# Patient Record
Sex: MALE | Race: OTHER | HISPANIC OR LATINO | URBAN - METROPOLITAN AREA
[De-identification: names, ages, dates, MRNs, and addresses within clinical notes are randomized per-mention and may not be internally consistent; named-entity substitution may affect disease eponyms.]

---

## 2019-05-23 ENCOUNTER — EMERGENCY (EMERGENCY)
Facility: HOSPITAL | Age: 39
LOS: 1 days | Discharge: HOME | End: 2019-05-23
Attending: EMERGENCY MEDICINE
Payer: SELF-PAY

## 2019-05-23 VITALS
DIASTOLIC BLOOD PRESSURE: 159 MMHG | SYSTOLIC BLOOD PRESSURE: 245 MMHG | HEART RATE: 94 BPM | WEIGHT: 201.94 LBS | RESPIRATION RATE: 16 BRPM | TEMPERATURE: 99 F | HEIGHT: 70 IN | OXYGEN SATURATION: 100 %

## 2019-05-23 LAB
ALBUMIN SERPL ELPH-MCNC: 5 G/DL — SIGNIFICANT CHANGE UP (ref 3.3–5.2)
ALP SERPL-CCNC: 67 U/L — SIGNIFICANT CHANGE UP (ref 40–120)
ALT FLD-CCNC: 30 U/L — SIGNIFICANT CHANGE UP
AMPHET UR-MCNC: NEGATIVE — SIGNIFICANT CHANGE UP
ANION GAP SERPL CALC-SCNC: 15 MMOL/L — SIGNIFICANT CHANGE UP (ref 5–17)
APPEARANCE UR: CLEAR — SIGNIFICANT CHANGE UP
APTT BLD: 33.5 SEC — SIGNIFICANT CHANGE UP (ref 27.5–36.3)
AST SERPL-CCNC: 22 U/L — SIGNIFICANT CHANGE UP
BARBITURATES UR SCN-MCNC: NEGATIVE — SIGNIFICANT CHANGE UP
BENZODIAZ UR-MCNC: NEGATIVE — SIGNIFICANT CHANGE UP
BILIRUB SERPL-MCNC: <0.2 MG/DL — LOW (ref 0.4–2)
BILIRUB UR-MCNC: NEGATIVE — SIGNIFICANT CHANGE UP
BUN SERPL-MCNC: 24 MG/DL — HIGH (ref 8–20)
CALCIUM SERPL-MCNC: 9.5 MG/DL — SIGNIFICANT CHANGE UP (ref 8.6–10.2)
CHLORIDE SERPL-SCNC: 103 MMOL/L — SIGNIFICANT CHANGE UP (ref 98–107)
CO2 SERPL-SCNC: 23 MMOL/L — SIGNIFICANT CHANGE UP (ref 22–29)
COCAINE METAB.OTHER UR-MCNC: NEGATIVE — SIGNIFICANT CHANGE UP
COLOR SPEC: SIGNIFICANT CHANGE UP
CREAT SERPL-MCNC: 1.85 MG/DL — HIGH (ref 0.5–1.3)
DIFF PNL FLD: NEGATIVE — SIGNIFICANT CHANGE UP
EPI CELLS # UR: SIGNIFICANT CHANGE UP
GLUCOSE SERPL-MCNC: 117 MG/DL — HIGH (ref 70–115)
GLUCOSE UR QL: NEGATIVE MG/DL — SIGNIFICANT CHANGE UP
HCT VFR BLD CALC: 41.6 % — LOW (ref 42–52)
HGB BLD-MCNC: 14 G/DL — SIGNIFICANT CHANGE UP (ref 14–18)
INR BLD: 0.92 RATIO — SIGNIFICANT CHANGE UP (ref 0.88–1.16)
KETONES UR-MCNC: NEGATIVE — SIGNIFICANT CHANGE UP
LEUKOCYTE ESTERASE UR-ACNC: NEGATIVE — SIGNIFICANT CHANGE UP
MAGNESIUM SERPL-MCNC: 2.3 MG/DL — SIGNIFICANT CHANGE UP (ref 1.6–2.6)
MCHC RBC-ENTMCNC: 28.5 PG — SIGNIFICANT CHANGE UP (ref 27–31)
MCHC RBC-ENTMCNC: 33.7 G/DL — SIGNIFICANT CHANGE UP (ref 32–36)
MCV RBC AUTO: 84.6 FL — SIGNIFICANT CHANGE UP (ref 80–94)
METHADONE UR-MCNC: NEGATIVE — SIGNIFICANT CHANGE UP
NITRITE UR-MCNC: NEGATIVE — SIGNIFICANT CHANGE UP
NT-PROBNP SERPL-SCNC: 122 PG/ML — SIGNIFICANT CHANGE UP (ref 0–300)
OPIATES UR-MCNC: NEGATIVE — SIGNIFICANT CHANGE UP
PCP SPEC-MCNC: SIGNIFICANT CHANGE UP
PCP UR-MCNC: NEGATIVE — SIGNIFICANT CHANGE UP
PH UR: 7 — SIGNIFICANT CHANGE UP (ref 5–8)
PLATELET # BLD AUTO: 205 K/UL — SIGNIFICANT CHANGE UP (ref 150–400)
POTASSIUM SERPL-MCNC: 3.8 MMOL/L — SIGNIFICANT CHANGE UP (ref 3.5–5.3)
POTASSIUM SERPL-SCNC: 3.8 MMOL/L — SIGNIFICANT CHANGE UP (ref 3.5–5.3)
PROT SERPL-MCNC: 8.3 G/DL — SIGNIFICANT CHANGE UP (ref 6.6–8.7)
PROT UR-MCNC: 30 MG/DL
PROTHROM AB SERPL-ACNC: 10.6 SEC — SIGNIFICANT CHANGE UP (ref 10–12.9)
RBC # BLD: 4.92 M/UL — SIGNIFICANT CHANGE UP (ref 4.6–6.2)
RBC # FLD: 13.5 % — SIGNIFICANT CHANGE UP (ref 11–15.6)
RBC CASTS # UR COMP ASSIST: SIGNIFICANT CHANGE UP /HPF (ref 0–4)
SODIUM SERPL-SCNC: 141 MMOL/L — SIGNIFICANT CHANGE UP (ref 135–145)
SP GR SPEC: 1.01 — SIGNIFICANT CHANGE UP (ref 1.01–1.02)
THC UR QL: NEGATIVE — SIGNIFICANT CHANGE UP
TROPONIN T SERPL-MCNC: <0.01 NG/ML — SIGNIFICANT CHANGE UP (ref 0–0.06)
TROPONIN T SERPL-MCNC: <0.01 NG/ML — SIGNIFICANT CHANGE UP (ref 0–0.06)
UROBILINOGEN FLD QL: NEGATIVE MG/DL — SIGNIFICANT CHANGE UP
WBC # BLD: 6.8 K/UL — SIGNIFICANT CHANGE UP (ref 4.8–10.8)
WBC # FLD AUTO: 6.8 K/UL — SIGNIFICANT CHANGE UP (ref 4.8–10.8)
WBC UR QL: SIGNIFICANT CHANGE UP

## 2019-05-23 PROCEDURE — 70450 CT HEAD/BRAIN W/O DYE: CPT | Mod: 26

## 2019-05-23 PROCEDURE — 99218: CPT

## 2019-05-23 PROCEDURE — 93306 TTE W/DOPPLER COMPLETE: CPT | Mod: 26

## 2019-05-23 PROCEDURE — 93010 ELECTROCARDIOGRAM REPORT: CPT | Mod: 76

## 2019-05-23 PROCEDURE — 71046 X-RAY EXAM CHEST 2 VIEWS: CPT | Mod: 26

## 2019-05-23 RX ORDER — AMLODIPINE BESYLATE 2.5 MG/1
10 TABLET ORAL ONCE
Refills: 0 | Status: COMPLETED | OUTPATIENT
Start: 2019-05-23 | End: 2019-05-23

## 2019-05-23 RX ORDER — LABETALOL HCL 100 MG
10 TABLET ORAL ONCE
Refills: 0 | Status: COMPLETED | OUTPATIENT
Start: 2019-05-23 | End: 2019-05-23

## 2019-05-23 RX ORDER — METFORMIN HYDROCHLORIDE 850 MG/1
500 TABLET ORAL ONCE
Refills: 0 | Status: COMPLETED | OUTPATIENT
Start: 2019-05-23 | End: 2019-05-23

## 2019-05-23 RX ORDER — HYDRALAZINE HCL 50 MG
10 TABLET ORAL ONCE
Refills: 0 | Status: COMPLETED | OUTPATIENT
Start: 2019-05-23 | End: 2019-05-23

## 2019-05-23 RX ORDER — FUROSEMIDE 40 MG
20 TABLET ORAL DAILY
Refills: 0 | Status: DISCONTINUED | OUTPATIENT
Start: 2019-05-23 | End: 2019-05-24

## 2019-05-23 RX ORDER — ACETAMINOPHEN 500 MG
975 TABLET ORAL ONCE
Refills: 0 | Status: COMPLETED | OUTPATIENT
Start: 2019-05-23 | End: 2019-05-23

## 2019-05-23 RX ADMIN — AMLODIPINE BESYLATE 10 MILLIGRAM(S): 2.5 TABLET ORAL at 21:08

## 2019-05-23 RX ADMIN — Medication 10 MILLIGRAM(S): at 18:20

## 2019-05-23 RX ADMIN — METFORMIN HYDROCHLORIDE 500 MILLIGRAM(S): 850 TABLET ORAL at 21:08

## 2019-05-23 RX ADMIN — Medication 10 MILLIGRAM(S): at 17:49

## 2019-05-23 RX ADMIN — Medication 20 MILLIGRAM(S): at 21:08

## 2019-05-23 RX ADMIN — Medication 975 MILLIGRAM(S): at 23:58

## 2019-05-23 RX ADMIN — Medication 1.25 MILLIGRAM(S): at 17:49

## 2019-05-23 RX ADMIN — Medication 10 MILLIGRAM(S): at 18:00

## 2019-05-23 NOTE — ED ADULT NURSE NOTE - CAS EDN DISCHARGE ASSESSMENT
Symptoms improved/Dressing clean and dry/Alert and oriented to person, place and time/Patient baseline mental status/Awake

## 2019-05-23 NOTE — ED ADULT TRIAGE NOTE - CHIEF COMPLAINT QUOTE
Pt comes to ED from PMD for HTN, BP in /159, takes amlodipine, furosemide and edarbi daily, offers no complaints at this time

## 2019-05-23 NOTE — ED PROVIDER NOTE - EKG ADDITIONAL INFORMATION FREE TEXT
nsr 88 t wave flattening,inversion lateral and inferior leads nsr 88 t wave flattening,inversion lateral and inferior leads - will admit to OBS cardiac protocol [facility] cards to follow in am

## 2019-05-23 NOTE — ED PROVIDER NOTE - CPE EDP SKIN NORM
[facility] Cardiology Discharge Summary     Patient ID:  Yoan Everett  [id number]  81 y.o.  1976    Admit date: 1/10/2017    Discharge date:  1/12/17    Admitting Physician: Donovan Guidry MD     Discharge Physician: Paul Roche NP/Dr. Liseth Wright    Admission Diagnoses: a fib and chest pain    Discharge Diagnoses:   Patient Active Problem List    Diagnosis Date Noted    Coronary artery disease involving native coronary artery of native heart with unstable angina pectoris ([facility] [place] [address] 01/12/2017    Paroxysmal atrial fibrillation ([facility] [place] [address] 01/11/2017    Precordial pain 01/11/2017       Cardiology Procedures this admission:  Left heart catheterization with PCI  EchoCardiogram  Consults: None    Hospital Course: Patient was seen in ED at [facility] with complaints of chest pain during atrial fibrillation consistent with unstable angina. The patient was scheduled for a LHC at [facility] on 1/11/17. Patient underwent cardiac catheterization by Dr. Donna Sifuentes. Patient was found to have a 70% stenosis of the pLAD (FFR was markedly abnormal at 0.69, indicating hemodynamically significant stenosis.) that was stented with a 3.5 x 28 mm Synergy RUBEN with 0% residual stenosis. Patient tolerated the procedure well and was taken to the CV stepdown floor for recovery. The earlier morning of 1/11/17, the patient converted back to NSR. The morning of 1/11/17, the patient remained in NSR but sotalol was stopped and lopressor and asa ([id number] of 0) were started. The earlier morning of1/12/17, the patient had short 5 beat run NSVT (asymptomatic with no chest pain) so patient was monitored until the afternoon with no reoccurrence. The patient will be d/c home with close follow up. Patient's right radial cath site was clean, dry and intact without hematoma or bruit. Patient's labs were WNL. Patient was seen and examined by Dr. Liseth Wright and determined stable and ready for discharge.  Patient was instructed on the importance of medication compliance including taking aspirin and effient everyday without missing a dose. After receiving drug eluting stents, the patient will remain on dual anti-platelet therapy for 1 year. For maximized medical therapy for CAD, patient will continue BB and statin as well. The patient will follow up with [facility] Cardiology Dr. Pham Pearson on Jan 17th at 1 am in [place] office and has been referred to cardiac rehab. DISPOSITION: The patient is being discharged home in stable condition on a low saturated fat, low cholesterol and low salt diet. The patient is instructed to advance activities as tolerated to the limit of fatigue or shortness of breath. The patient is instructed to avoid all heavy lifting for 5 days. The patient is instructed to watch the cath site for bleeding/oozing; if seen, the patient is instructed to apply firm pressure with a clean cloth and call [facility]iology at [phone]. The patient is instructed to watch for signs of infection which include: increasing area of redness, fever/hot to touch or purulent drainage at the catheterization site. The patient is instructed not to soak in a bathtub for 7-10 days, but is cleared to shower. The patient is instructed to call the office or return to the ER for immediate evaluation for any shortness of breath or chest pain not relieved by NTG. Discharge Exam:   Visit Vitals    /69    Pulse 68    Temp 97.9 °F (36.6 °C)    Resp 17    Ht 5' 9\" (1.753 m)    Wt 83.1 kg (183 lb 3.2 oz)    SpO2 99%    BMI 27.05 kg/m2     Patient has been seen by Dr. Darrius Hernandez: see his progress note for exam details.     Recent Results (from the past 24 hour(s))   PTT    Collection Time: 01/11/17 11:56 AM   Result Value Ref Range    aPTT 60.0 (H) 23.5 - 31.7 SEC   EKG, 12 LEAD, INITIAL    Collection Time: 01/11/17  7:03 PM   Result Value Ref Range    Systolic BP  mmHg    Diastolic BP  mmHg    Ventricular Rate 72 BPM    Atrial Rate 72 BPM    P-R Interval 144 ms    QRS Duration 84 ms    Q-T Interval 406 ms    QTC Calculation (Bezet) 444 ms    Calculated P Axis 63 degrees    Calculated R Axis 20 degrees    Calculated T Axis 63 degrees    Diagnosis       Normal sinus rhythm  Normal ECG  Confirmed by Akash Carrillo MD (), AMAN WARD ([id number]) on 1/11/2017 68:82:15 PM     METABOLIC PANEL, BASIC    Collection Time: 01/12/17  3:35 AM   Result Value Ref Range    Sodium 143 136 - 145 mmol/L    Potassium 4.1 3.5 - 5.1 mmol/L    Chloride 107 98 - 107 mmol/L    CO2 26 21 - 32 mmol/L    Anion gap 10 7 - 16 mmol/L    Glucose 90 65 - 100 mg/dL    BUN 7 6 - 23 MG/DL    Creatinine 0.71 (L) 0.8 - 1.5 MG/DL    GFR est AA >60 >60 ml/min/1.73m2    GFR est non-AA >60 >60 ml/min/1.73m2    Calcium 8.0 (L) 8.3 - 10.4 MG/DL   LIPID PANEL    Collection Time: 01/12/17  3:35 AM   Result Value Ref Range    LIPID PROFILE          Cholesterol, total 119 <200 MG/DL    Triglyceride 120 35 - 150 MG/DL    HDL Cholesterol 25 (L) 40 - 60 MG/DL    LDL, calculated 70 <100 MG/DL    VLDL, calculated 24 (H) 6.0 - 23.0 MG/DL    CHOL/HDL Ratio 4.8     MAGNESIUM    Collection Time: 01/12/17  3:35 AM   Result Value Ref Range    Magnesium 2.2 1.8 - 2.4 mg/dL   CBC WITH AUTOMATED DIFF    Collection Time: 01/12/17  3:35 AM   Result Value Ref Range    WBC 4.3 4.3 - 11.1 K/uL    RBC 2.77 (L) 4.23 - 5.67 M/uL    HGB 11.8 (L) 13.6 - 17.2 g/dL    HCT 33.8 (L) 41.1 - 50.3 %    .0 (H) 79.6 - 97.8 FL    MCH 42.6 (H) 26.1 - 32.9 PG    MCHC 34.9 31.4 - 35.0 g/dL    RDW 12.8 11.9 - 14.6 %    PLATELET 403 632 - 487 K/uL    MPV 9.8 (L) 10.8 - 14.1 FL    DF AUTOMATED      NEUTROPHILS 46 43 - 78 %    LYMPHOCYTES 28 13 - 44 %    MONOCYTES 19 (H) 4.0 - 12.0 %    EOSINOPHILS 7 0.5 - 7.8 %    BASOPHILS 0 0.0 - 2.0 %    IMMATURE GRANULOCYTES 0.2 0.0 - 5.0 %    ABS. NEUTROPHILS 2.0 1.7 - 8.2 K/UL    ABS. LYMPHOCYTES 1.2 0.5 - 4.6 K/UL    ABS. MONOCYTES 0.8 0.1 - 1.3 K/UL    ABS. EOSINOPHILS 0.3 0.0 - 0.8 K/UL    ABS.  BASOPHILS 0.0 0.0 - 0.2 K/UL    ABS. IMM. GRANS. 0.0 0.0 - 0.5 K/UL         Patient Instructions:   Current Discharge Medication List      START taking these medications    Details   aspirin 81 mg chewable tablet Take 1 Tab by mouth daily. Qty: 30 Tab, Refills: 0      atorvastatin (LIPITOR) 80 mg tablet Take 1 Tab by mouth nightly. Qty: 30 Tab, Refills: 11      metoprolol tartrate (LOPRESSOR) 25 mg tablet Take 1 Tab by mouth every twelve (12) hours. Qty: 60 Tab, Refills: 11      prasugrel (EFFIENT) 10 mg tablet Take 1 Tab by mouth daily. Qty: 30 Tab, Refills: 11      nitroglycerin (NITROSTAT) 0.4 mg SL tablet 1 Tab by SubLINGual route every five (5) minutes as needed for Chest Pain.   Qty: 1 Bottle, Refills: 5                 Signed:  SAVAGE Odell  1/12/2017  9:16 AM    Yasir Devi MD normal...

## 2019-05-23 NOTE — ED CDU PROVIDER INITIAL DAY NOTE - OBJECTIVE STATEMENT
37 y/o M hx of HTN. [place] ER from doctors [place] for 's/180's but asymptomatic. pressure reduced in ER.  placed in observation for trending of trops and [place] cardiology consultation for recommendations for persistent elevated BP. denies cp sob headache nausea abdominal pains

## 2019-05-24 VITALS
DIASTOLIC BLOOD PRESSURE: 109 MMHG | SYSTOLIC BLOOD PRESSURE: 166 MMHG | RESPIRATION RATE: 18 BRPM | TEMPERATURE: 99 F | HEART RATE: 74 BPM | OXYGEN SATURATION: 98 %

## 2019-05-24 DIAGNOSIS — I16.0 HYPERTENSIVE URGENCY: ICD-10-CM

## 2019-05-24 LAB
ALBUMIN SERPL ELPH-MCNC: 4.5 G/DL — SIGNIFICANT CHANGE UP (ref 3.3–5.2)
ALP SERPL-CCNC: 63 U/L — SIGNIFICANT CHANGE UP (ref 40–120)
ALT FLD-CCNC: 25 U/L — SIGNIFICANT CHANGE UP
ANION GAP SERPL CALC-SCNC: 14 MMOL/L — SIGNIFICANT CHANGE UP (ref 5–17)
AST SERPL-CCNC: 18 U/L — SIGNIFICANT CHANGE UP
BILIRUB SERPL-MCNC: 0.4 MG/DL — SIGNIFICANT CHANGE UP (ref 0.4–2)
BUN SERPL-MCNC: 20 MG/DL — SIGNIFICANT CHANGE UP (ref 8–20)
CALCIUM SERPL-MCNC: 9.3 MG/DL — SIGNIFICANT CHANGE UP (ref 8.6–10.2)
CHLORIDE SERPL-SCNC: 104 MMOL/L — SIGNIFICANT CHANGE UP (ref 98–107)
CO2 SERPL-SCNC: 24 MMOL/L — SIGNIFICANT CHANGE UP (ref 22–29)
CREAT SERPL-MCNC: 1.28 MG/DL — SIGNIFICANT CHANGE UP (ref 0.5–1.3)
GLUCOSE SERPL-MCNC: 110 MG/DL — SIGNIFICANT CHANGE UP (ref 70–115)
POTASSIUM SERPL-MCNC: 4 MMOL/L — SIGNIFICANT CHANGE UP (ref 3.5–5.3)
POTASSIUM SERPL-SCNC: 4 MMOL/L — SIGNIFICANT CHANGE UP (ref 3.5–5.3)
PROT SERPL-MCNC: 7.3 G/DL — SIGNIFICANT CHANGE UP (ref 6.6–8.7)
SODIUM SERPL-SCNC: 142 MMOL/L — SIGNIFICANT CHANGE UP (ref 135–145)
TROPONIN T SERPL-MCNC: <0.01 NG/ML — SIGNIFICANT CHANGE UP (ref 0–0.06)

## 2019-05-24 PROCEDURE — 36415 COLL VENOUS BLD VENIPUNCTURE: CPT

## 2019-05-24 PROCEDURE — 93005 ELECTROCARDIOGRAM TRACING: CPT

## 2019-05-24 PROCEDURE — 96374 THER/PROPH/DIAG INJ IV PUSH: CPT | Mod: XU

## 2019-05-24 PROCEDURE — 85610 PROTHROMBIN TIME: CPT

## 2019-05-24 PROCEDURE — 81001 URINALYSIS AUTO W/SCOPE: CPT

## 2019-05-24 PROCEDURE — G0378: CPT

## 2019-05-24 PROCEDURE — 80053 COMPREHEN METABOLIC PANEL: CPT

## 2019-05-24 PROCEDURE — 99223 1ST HOSP IP/OBS HIGH 75: CPT

## 2019-05-24 PROCEDURE — 80307 DRUG TEST PRSMV CHEM ANLYZR: CPT

## 2019-05-24 PROCEDURE — 96375 TX/PRO/DX INJ NEW DRUG ADDON: CPT | Mod: XU

## 2019-05-24 PROCEDURE — 85730 THROMBOPLASTIN TIME PARTIAL: CPT

## 2019-05-24 PROCEDURE — 83735 ASSAY OF MAGNESIUM: CPT

## 2019-05-24 PROCEDURE — 70450 CT HEAD/BRAIN W/O DYE: CPT

## 2019-05-24 PROCEDURE — 93010 ELECTROCARDIOGRAM REPORT: CPT

## 2019-05-24 PROCEDURE — 99285 EMERGENCY DEPT VISIT HI MDM: CPT | Mod: 25

## 2019-05-24 PROCEDURE — 84484 ASSAY OF TROPONIN QUANT: CPT

## 2019-05-24 PROCEDURE — 85027 COMPLETE CBC AUTOMATED: CPT

## 2019-05-24 PROCEDURE — 71046 X-RAY EXAM CHEST 2 VIEWS: CPT

## 2019-05-24 PROCEDURE — C8929: CPT

## 2019-05-24 PROCEDURE — 99217: CPT

## 2019-05-24 PROCEDURE — 83880 ASSAY OF NATRIURETIC PEPTIDE: CPT

## 2019-05-24 RX ORDER — AZILSARTAN KAMEDOXOMIL 40 MG/1
1 TABLET ORAL
Qty: 30 | Refills: 0
Start: 2019-05-24 | End: 2019-06-22

## 2019-05-24 RX ORDER — LOSARTAN POTASSIUM 100 MG/1
100 TABLET, FILM COATED ORAL DAILY
Refills: 0 | Status: DISCONTINUED | OUTPATIENT
Start: 2019-05-24 | End: 2019-05-28

## 2019-05-24 RX ORDER — METFORMIN HYDROCHLORIDE 850 MG/1
1 TABLET ORAL
Qty: 60 | Refills: 0
Start: 2019-05-24 | End: 2019-06-22

## 2019-05-24 RX ORDER — AMLODIPINE BESYLATE 2.5 MG/1
10 TABLET ORAL ONCE
Refills: 0 | Status: COMPLETED | OUTPATIENT
Start: 2019-05-24 | End: 2019-05-24

## 2019-05-24 RX ORDER — AMLODIPINE BESYLATE 2.5 MG/1
1 TABLET ORAL
Qty: 30 | Refills: 0
Start: 2019-05-24 | End: 2019-06-22

## 2019-05-24 RX ORDER — AMLODIPINE BESYLATE 2.5 MG/1
10 TABLET ORAL DAILY
Refills: 0 | Status: DISCONTINUED | OUTPATIENT
Start: 2019-05-24 | End: 2019-05-28

## 2019-05-24 RX ADMIN — AMLODIPINE BESYLATE 10 MILLIGRAM(S): 2.5 TABLET ORAL at 13:31

## 2019-05-24 RX ADMIN — Medication 0.1 MILLIGRAM(S): at 04:55

## 2019-05-24 RX ADMIN — Medication 975 MILLIGRAM(S): at 00:58

## 2019-05-24 RX ADMIN — LOSARTAN POTASSIUM 100 MILLIGRAM(S): 100 TABLET, FILM COATED ORAL at 16:14

## 2019-05-24 NOTE — CONSULT NOTE ADULT - ASSESSMENT
Improved MALLORY   Was on Lasix , but volume status looks OK   EWould hold Lasix   [place] UA   Normal renal sonogram 2016    HTN Urgency   BP trending better   UDS negative   CT head OK   CXR OK   LVH , preserved EF on ECHO   As per family , there is a h/o non-compliance with meds   Give Norvasc 10 mg now   Can Continue on Edarbi 80 mg daily and Clonidine 0.1 mg po bid   If BP gets better , and pt improved, I have given card for him top follow up w/ me in 1 week as outpatient   Discussed with his family too.

## 2019-05-24 NOTE — CONSULT NOTE ADULT - ATTENDING COMMENTS
Suspect resistant secondary hypertension possibly on a renal basis. Will c/s with nephrology for further w/u of possible secondary causes and medication recommendation in setting of renal dysfunction.

## 2019-05-24 NOTE — CONSULT NOTE ADULT - SUBJECTIVE AND OBJECTIVE BOX
Patient is a 38y old  Male who presents with a chief complaint of     HPI: Was sent in from Dr Jaffe's office with very elevated BP   Feels better   CT head negative   ECHO done   Was on 40 mg Lasix and Edarbi as outpatient   No h/o edema or CHF   Feels better   Creat was 1.8 , now 1.2   h/o non compliance with BP meds as per family   UDS Negative     PAST MEDICAL & SURGICAL HISTORY:  No pertinent past medical history  No significant past surgical history      FAMILY HISTORY:  No pertinent family history in first degree relatives      Social History:    MEDICATIONS  (STANDING):  cloNIDine 0.1 milliGRAM(s) Oral two times a day  furosemide    Tablet 20 milliGRAM(s) Oral daily    MEDICATIONS  (PRN):      Allergies    No Known Allergies    Intolerances      Vital Signs Last 24 Hrs  T(C): 37 (24 May 2019 11:27), Max: 37.3 (23 May 2019 17:23)  T(F): 98.6 (24 May 2019 11:27), Max: 99.2 (23 May 2019 17:23)  HR: 73 (24 May 2019 11:27) (71 - 94)  BP: 170/107 (24 May 2019 11:27) (149/88 - 245/159)  BP(mean): --  RR: 17 (24 May 2019 11:27) (16 - 20)  SpO2: 98% (24 May 2019 11:27) (98% - 100%)  Daily Height in cm: 177.8 (23 May 2019 17:23)    Daily   I&O's Detail    I&O's Summary      PHYSICAL EXAM:    GENERAL: NAD,   HEAD:  Atraumatic, No edema   NECK: Supple, No JVD,   CHEST/LUNG: Clear / EAE , No wheeze   HEART: Regular rate and rhythm; No rub   ABDOMEN: Soft, Nontender, Nondistended  EXTREMITIES: No edema     LABS:                        14.0   6.8   )-----------( 205      ( 23 May 2019 17:42 )             41.6     05-    142  |  104  |  20.0  ----------------------------<  110  4.0   |  24.0  |  1.28    Ca    9.3      24 May 2019 09:42  Mg     2.3         TPro  7.3  /  Alb  4.5  /  TBili  0.4  /  DBili  x   /  AST  18  /  ALT  25  /  AlkPhos  63      PT/INR - ( 23 May 2019 17:42 )   PT: 10.6 sec;   INR: 0.92 ratio         PTT - ( 23 May 2019 17:42 )  PTT:33.5 sec  Urinalysis Basic - ( 23 May 2019 20:34 )    Color: Pale Yellow / Appearance: Clear / S.010 / pH: x  Gluc: x / Ketone: Negative  / Bili: Negative / Urobili: Negative mg/dL   Blood: x / Protein: 30 mg/dL / Nitrite: Negative   Leuk Esterase: Negative / RBC: 0-2 /HPF / WBC 0-2   Sq Epi: x / Non Sq Epi: Occasional / Bacteria: x      Magnesium, Serum: 2.3 mg/dL ( @ 17:42)      AM:  CT BRAIN                          PROCEDURE DATE:  2019          INTERPRETATION:  HISTORY: Hypertension, headache    COMPARISON: 2016    Multiple contiguous transaxial images of the brain were obtained from the   skull base to thevertex. Reformatted sagittal and coronal imaging also   performed.    FINDINGS: There is no evidence for recent intraparenchymal hemorrhage or   acute territorial type infarct. No effacement of the overlying cortical   sulci is identified. No midlineshift is demonstrated. There is no   evidence for hydrocephalus. No extra-axial fluid collections are noted.     There is no evidence for acute osseous fracture. Mild ethmoid mucosal   thickening is noted. Regions of polypoidal thickening identified within   the visualized portions of the right maxillary and left sphenoid sinus,   stable. Prominent polypoidal mucosal thickening is identified within the   visualized portion of the left maxillary paranasal sinus with focal   calcification, stable.    IMPRESSION: There is no evidence for recent intraparenchymal hemorrhage   or acute territorial type infarct. No extra-axial fluid collections   identified. See full discussion above.             EXAM:  XR CHEST PA LAT 2V                          PROCEDURE DATE:  2019          INTERPRETATION:  History: Chest pain    PA and lateral radiographs of the chest are performed. There are no prior   studies for comparison.    The cardiomediastinal silhouette is normal. The telma are not enlarged.   The trachea is midline. There is no focal infiltrate or pleural effusion.   The osseous structures are intact.    Impression: No active disease.                DRE BIRMINGHAM M.D.,ATTENDING RADIOLOGIST  This document has been electronically signed. May 23 2019  6:59PM          :  ECHO TTE W CON COMPLETE W DOPP      PROCEDURE DATE:  May 23 2019   .      INTERPRETATION:  REPORT:    TRANSTHORACIC ECHOCARDIOGRAM REPORT         Patient Name:   BLANCA GRIFFIN Patient Location: [facility] Rec #:  [id number]        Accession #:      [id number]  Account #:                      Height:           69.7 in 177.0 cm  YOB: 1980       Weight:           200.6 lb 91.00 kg  Patient Age:    38 years        BSA:              2.08 m²  Patient Gender: M  BP:               187/92 mmHg       Date of Exam:        2019 9:54:37 PM  Sonographer:         Montana Li  Referring Physician: Naveed Machuca DO    Procedure:     2D Echo/Doppler/Color Doppler Complete.  Indications:   Essential (primary) hypertension - I10  Diagnosis:     Essential (primary) hypertension - I10  Study Details: Technically good study.         2D AND M-MODE MEASUREMENTS (normal ranges within parentheses):  Left                 Normal   Aorta/Left            Normal  Ventricle:                    Atrium:  IVSd (2D):    1.49  (0.7-1.1) Aortic Root  3.62 cm (2.4-3.7)                 cm             (2D):  LVPWd (2D):   1.52  (0.7-1.1) Left Atrium  3.43 cm (1.9-4.0)                 cm             (2D):  LVIDd (2D):   4.91  (3.4-5.7) LA Volume     27.7                 cm             Index         ml/m²  LVIDs (2D):   3.42            Right Ventricle:                 cm             RVd (2D):        3.32 cm  LV FS (2D):   30.3   (>25%)   TAPSE:           2.11 cm      %  Relative Wall 0.62   (<0.42)  Thickness    LV DIASTOLIC FUNCTION:  MV Peak E: 0.81 m/s e', MV Oralia: 0.11 m/s  MV Peak A: 0.98 m/s E/e' Ratio: 7.21  E/A Ratio: 0.83     Decel Time: 176 msec    SPECTRAL DOPPLER ANALYSIS (where applicable):  Mitral Valve:  MV P1/2 Time: 51.04 msec  MV Area, PHT: 4.31 cm²    Aortic Valve: AoV Max Danny: 1.35 m/s AoV Peak P.3 mmHg AoV Mean PG:    LVOT Vmax: 0.87 m/s LVOT VTI:  LVOT Diameter: 2.03 cm    AoV Area, Vmax: 2.10 cm² AoV Area, VTI:  AoV Area, Vmn:  Ao VTI:  Pulmonic Valve:  PV Max Velocity: 1.10 m/s PV Max P.8 mmHg PV Mean PG:       PHYSICIAN INTERPRETATION:  Left Ventricle: The left ventricular internal cavity size is normal.  Global LV systolic function was normal. Left ventricular ejection  fraction, by visual estimation, is 55 to 60%. Spectral Doppler shows   normal pattern of LV diastolic filling.  Right Ventricle: The right ventricular size is normal. RV systolic   function is normal. TV S' 0.1 m/s.  Left Atrium: Normal left atrialsize.  Right Atrium: Normal right atrial size.  Pericardium: There is no evidence of pericardial effusion.  Mitral Valve: Structurally normal mitral valve, with normal leaflet   excursion. Trace mitral valve regurgitation is seen.  Tricuspid Valve: The tricuspid valve is normal in structure. Trivial   tricuspid regurgitation is visualized. Adequate TR velocity was not   obtained to accurately assess RVSP.  Aortic Valve: Normal trileaflet aortic valve with normal opening. No   evidence of aortic valve regurgitation is seen.  Pulmonic Valve: The pulmonic valve is normal. Trace pulmonic valve   regurgitation.  Aorta: The aortic root is normal in size and structure. There is   dilatation of the ascending aorta.  Pulmonary Artery: The pulmonary artery is of normal size and origin.  Venous: The pulmonary veins appear normal. The inferior vena cava is   normal. The inferior vena cava was normal sized, with respiratory size   variation greater than 50%.       Summary:   1. Left ventricular ejection fraction, by visual estimation, is 55 to   60%.   2. Normal global left ventricular systolic function.   3. There is moderate concentric left ventricular hypertrophy.   4. Dilatation of the ascending aorta.    MD Ilya Electronically signed on 2019 at 8:01:44 AM              *** Final ***                  ROSY STEWART MD  This document has been electronically signed. May 23 2019  9:54PM          KAELA ZAMBRANO   This document has been electronically signed. May 23 2019  6:26PM         EXAM:  US KIDNEY(S)                            PROCEDURE DATE:  2016      INTERPRETATION:  INDICATIONS:  Hypertensive emergency. Evaluate for   underlying renal abnormality.    TECHNIQUE:  The examination was performed with the real time apparatus   with color flow and spectral doppler imaging.    COMPARISON EXAMINATION:  No prior exam    FINDINGS:  RIGHT KIDNEY:  Normal in size, shape, and configuration measuring 10.9 x   4.6 x 5.7 cm.  No cystic or solid masses.  No calculi.  LEFT KIDNEY:  Normal in size, shape, and configuration measuring 11.4 x   6.7 x 4.5 cm. No  solid masses.  No calculi. Small mid pole simple cyst   measuring 1.8 x 1.7 cm.    There was no evidence of obstruction in either kidney. Bilateral ureteral   jets visualized.    IMPRESSION: Normal renal sonogram.                 DAMARIS GUERRA M.D., ATTENDING RADIOLOGIST  This document has been electronically signed. 2016  6:26P                    RADIOLOGY & ADDITIONAL TESTS:

## 2019-05-24 NOTE — CONSULT NOTE ADULT - ASSESSMENT
39 y/o male previously diagnosed with HTN presents from PMD office with hypertensive urgency, asymptomatic. Found to have SBP>200 mmHg despite compliance with meds.

## 2019-05-24 NOTE — CONSULT NOTE ADULT - PROBLEM SELECTOR RECOMMENDATION 9
-observation on telemetry overnight  -continue Amlodipine 10 mg, Edarbi 80 mg and Lasix 20 mg  -add Clonidine 0.1 mg Q12  -Labetalol 10 mg IV Q4prn for SBP>180  -echocardiogram  -low-sodium diet  -nephrology consult

## 2019-05-24 NOTE — CONSULT NOTE ADULT - SUBJECTIVE AND OBJECTIVE BOX
History obtained by: Patient and medical record     obtained: No    Chief complaint:  "My blood pressure is high"    HPI:  This is 37 y/o male with hx of HTN and DM2 who presents from PMD office with hypertensive urgency. Pt states he was originally diagnosed with HTN in  and was initially compliant with medications but stopped taking them about 2 years ago. He became Dr. Jaffe's pt a month ago and at that time he was started on Amlodipine, Edarbi and Lasix  but BP still elevated despite compliance. Pt now referred to the hospital for evaluation.  On arrival to the ER /159, treated with Labetalol, Hydralazine and Vasotec IV. Pt denies chest pain, SOB, syncope or leg swelling. He gets occasional palpitations when going to sleep, otherwise asymptomatic, no headaches or dizziness. EKG shows SR 88 bpm with inverted T-waves in inferior and lateral leads (similar to EKG in 2016), no ectopy on telemetry. Troponin negative x2, proBNP normal. Pt also found to have elevated creatinine and proteinuria.       REVIEW OF SYMPTOMS:   Cardiovascular: as per HPI  Respiratory:  denies dyspnea,   cough,     Genitourinary:  No dysuria, no hematuria;   Gastrointestinal:   No dark color stool, no melena, no diarrhea, no constipation, no abdominal pain;   Neurological: No headache, no dizziness, no slurred speech;    Psychiatric: No agitation, no anxiety.  ALL OTHER REVIEW OF SYSTEMS ARE NEGATIVE.    MEDICATIONS  (home):  Amlodipine 10 mg  Edarbi 80 mg  Lasix 20 mg  Metformin 500 mg        PAST MEDICAL & SURGICAL HISTORY:  No pertinent past medical history  No significant past surgical history      FAMILY HISTORY:  father HTN, DM2  brother HTN      SOCIAL HISTORY: lives alone, works in factory as     CIGARETTES: never smoked    ALCOHOL: denies    DRUGS: denies    Vital Signs Last 24 Hrs  T(C): 36.7 (23 May 2019 23:30), Max: 37.3 (23 May 2019 17:23)  T(F): 98 (23 May 2019 23:30), Max: 99.2 (23 May 2019 17:23)  HR: 87 (23 May 2019 23:30) (71 - 94)  BP: 169/106 (23 May 2019 23:30) (167/70 - 245/159)  BP(mean): --  RR: 17 (23 May 2019 23:30) (16 - 20)  SpO2: 98% (23 May 2019 23:30) (98% - 100%)    PHYSICAL EXAM:  General: WN/WD NAD  Neurology: A&Ox3, nonfocal, MARSHALL x 4  Eyes: PERRL/ EOMI, Gross vision intact  ENT/Neck: Neck supple, trachea midline, No JVD, Gross hearing intact  Respiratory: CTA B/L, No wheezing, rales, rhonchi  CV: RRR, S1S2, no murmurs, rubs or gallops  Abdominal: Soft, NT, ND +BS,   Extremities: No edema, + peripheral pulses  Skin: No Rashes, Hematoma, Ecchymosis          INTERPRETATION OF TELEMETRY: SR 80's-90's, no ectopy    ECG: SR 88 bpm, inverted T-waves in inferior and lateral leads      I&O's Detail      LABS:                        14.0   6.8   )-----------( 205      ( 23 May 2019 17:42 )             41.6     05-    141  |  103  |  24.0<H>  ----------------------------<  117<H>  3.8   |  23.0  |  1.85<H>    Ca    9.5      23 May 2019 17:42  Mg     2.3     05-    TPro  8.3  /  Alb  5.0  /  TBili  <0.2<L>  /  DBili  x   /  AST  22  /  ALT  30  /  AlkPhos  67  05-23    CARDIAC MARKERS ( 23 May 2019 21:28 )  x     / <0.01 ng/mL / x     / x     / x      CARDIAC MARKERS ( 23 May 2019 17:42 )  x     / <0.01 ng/mL / x     / x     / x          PT/INR - ( 23 May 2019 17:42 )   PT: 10.6 sec;   INR: 0.92 ratio         PTT - ( 23 May 2019 17:42 )  PTT:33.5 sec  Urinalysis Basic - ( 23 May 2019 20:34 )    Color: Pale Yellow / Appearance: Clear / S.010 / pH: x  Gluc: x / Ketone: Negative  / Bili: Negative / Urobili: Negative mg/dL   Blood: x / Protein: 30 mg/dL / Nitrite: Negative   Leuk Esterase: Negative / RBC: 0-2 /HPF / WBC 0-2   Sq Epi: x / Non Sq Epi: Occasional / Bacteria: x      I&O's Summary      RADIOLOGY & ADDITIONAL STUDIES:  X-ray:    PREVIOUS DIAGNOSTIC TESTING:      ECHO  < from: TTE Echo Complete w/Doppler (16 @ 10:02) >  IMPRESSION: Summary:   1. The left atrium is normal in size.   2. Normal left ventricular internal cavity size. Moderate concentric   left ventricular hypertrophy.   3. Normal wall motion. Left ventricular ejection fraction, by visual   estimation, is 60 to 65%.   4. The right atrium is normal in size.   5. Normal right ventricular size and function.   6. No significant valvular abnormality.   7. There is no evidence of pericardial effusion.     MD Jordi Electronically signed on 2016 at 3:10:48 PM    < end of copied text >      STRESS    < from: Nuclear Stress Test-Pharmacologic (16 @ 10:58) >  GATED ANALYSIS:  Gated wall motion analysis is performed, and shows normal  wall motion with post stress LVEF of 68%.  ------------------------------------------------------------------------      LV/RV OBSERVATION:  Normal LV ejection fraction.  ------------------------------------------------------------------------    IMPRESSIONS:Normal Study  There was no chest pain following regadenoson infusion.  There were no diagnostic ECG changes.  The left ventricle was normal in size. Concentric  hypertrophy noted.  Normal myocardial perfusion scan, with  no evidence of infarction or inducible ischemia.There is a  small, mild defect, suggestive of no clear evidence of  ischemia consistent with attenuation artifact.Review of  raw data shows: Diaphragmatic artifact.  Gated wall motion analysis is performed, and shows normal  wall motion with post stress LVEF of 68%.    ------------------------------------------------------------------------      ------------------------------------------------------------------------    Confirmed on  2016 - 17:43:12 by Jose Sin MD  Cardiology Fellow: GERARD Buitrago    < end of copied text >      CATHETERIZATION: n/a

## 2019-05-24 NOTE — ED CDU PROVIDER DISPOSITION NOTE - CARE PROVIDER_API CALL
Cortes Georges (MD)  Cardiovascular Disease; Internal Medicine  [address] [place] [address]  Phone: [phone]  Fax: [phone]  Follow Up Time:     Donny Rankin (DO)  Medicine  [address] [place] [address] [place] [address]  Phone: [phone]  Fax: [phone]  Follow Up Time:

## 2019-05-24 NOTE — ED CDU PROVIDER DISPOSITION NOTE - CLINICAL COURSE
38 M with hypertensive urgency 200/100, seen by cardiology reccomended medications and seen by nephrology as patients BUN/cr took a bump, rpt WNL, will c/w Edarbi, clonidine, amlodopine and f/u as outpatient

## 2019-05-24 NOTE — ED CDU PROVIDER DISPOSITION NOTE - ATTENDING CONTRIBUTION TO CARE
37 yo male pmh htn with hypertensive urgency; improved with tx and meds changed as per recommendations by nephrology; continue edarbi, clonidine and amlodipine ;  close follow up with pcp and nephrology as outpatient

## 2019-05-24 NOTE — ED ADULT NURSE REASSESSMENT NOTE - NS ED NURSE REASSESS COMMENT FT1
B/p-175/122, pt resting in stretcher with no complaints of pain or discomfort. JOSE Diana made aware and per PA will place medication order.
Pt A&OX3, denies any pain or discomfort, received @ 1845.  CM in place, NSR.  Pt is still hypertensive but has improved since he came into ED.  Pt amb ad antolin.  CM maintained.  Will continue to monitor.
Pt alert and oriented. resting in stretcher, no signs of distress noted. Handoff report given to Renato Yanes RN and pt's safety maintained. RN with no questions or concerns at this time
assumed care of pt 1915 charting as noted. pt alert and oriented x4. pt denies HA blurred vision. respirations even unlabored. pt in no current distress. pt educated on plan of care, pt able to successfully teach back plan of care to RN, RN will continue to reeducate pt during hospital stay.
assumed pt care from previous Rn Kenyetta Mcnamara @0116.  pt transported to CDU-6 for observation. pt  A&Ox3;  resting in stretcher, with complaints of frontal headache. denies any blurry vision, N/V or chest pain. B/L lungs clear, normal s1&s2 heard on ausculation. (+) pedal pulses, skin warm/dry intact. PIV patent. B/P-169/106, PA Danita Diana made aware and  per PA, will place orders for PO Tylenol.  awaiting Blood verified by RN. blood initiated 0020: pt educated on any possible signs/symptoms of blood transfusion reaction. Pt A&Ox4,  denies any chest, lower back pain or any SOB. VSS and documented as per flow sheet. monitoring on-going for any changes  draw and echo results. plan of care reviewed and pt verbalizes understanding. bed in lowest position, call bell within reach and safety maintained. monitoring ongoing for any changes.

## 2020-02-11 ENCOUNTER — EMERGENCY (EMERGENCY)
Facility: HOSPITAL | Age: 40
LOS: 1 days | Discharge: HOME | End: 2020-02-11
Attending: EMERGENCY MEDICINE | Admitting: EMERGENCY MEDICINE
Payer: COMMERCIAL

## 2020-02-11 VITALS
OXYGEN SATURATION: 97 % | TEMPERATURE: 99 F | SYSTOLIC BLOOD PRESSURE: 200 MMHG | RESPIRATION RATE: 18 BRPM | DIASTOLIC BLOOD PRESSURE: 134 MMHG | HEART RATE: 98 BPM

## 2020-02-11 VITALS
DIASTOLIC BLOOD PRESSURE: 122 MMHG | OXYGEN SATURATION: 98 % | HEART RATE: 88 BPM | SYSTOLIC BLOOD PRESSURE: 197 MMHG | RESPIRATION RATE: 18 BRPM

## 2020-02-11 DIAGNOSIS — E78.5 HYPERLIPIDEMIA, UNSPECIFIED: ICD-10-CM

## 2020-02-11 DIAGNOSIS — Z91.14 PATIENT'S OTHER NONCOMPLIANCE WITH MEDICATION REGIMEN: ICD-10-CM

## 2020-02-11 DIAGNOSIS — S01.01XA LACERATION WITHOUT FOREIGN BODY OF SCALP, INITIAL ENCOUNTER: ICD-10-CM
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
## 2020-02-11 DIAGNOSIS — Z79.899 OTHER LONG TERM (CURRENT) DRUG THERAPY: ICD-10-CM

## 2020-02-11 DIAGNOSIS — Z23 ENCOUNTER FOR IMMUNIZATION: ICD-10-CM
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
## 2020-02-11 DIAGNOSIS — I10 ESSENTIAL (PRIMARY) HYPERTENSION: ICD-10-CM

## 2020-02-11 PROCEDURE — 90715 TDAP VACCINE 7 YRS/> IM: CPT

## 2020-02-11 PROCEDURE — 90471 IMMUNIZATION ADMIN: CPT

## 2020-02-11 PROCEDURE — 99284 EMERGENCY DEPT VISIT MOD MDM: CPT

## 2020-02-11 PROCEDURE — 99283 EMERGENCY DEPT VISIT LOW MDM: CPT | Mod: 25

## 2020-02-11 RX ORDER — AMLODIPINE BESYLATE 2.5 MG/1
10 TABLET ORAL ONCE
Refills: 0 | Status: COMPLETED | OUTPATIENT
Start: 2020-02-11 | End: 2020-02-11

## 2020-02-11 RX ORDER — TETANUS TOXOID, REDUCED DIPHTHERIA TOXOID AND ACELLULAR PERTUSSIS VACCINE, ADSORBED 5; 2.5; 8; 8; 2.5 [IU]/.5ML; [IU]/.5ML; UG/.5ML; UG/.5ML; UG/.5ML
0.5 SUSPENSION INTRAMUSCULAR ONCE
Refills: 0 | Status: COMPLETED | OUTPATIENT
Start: 2020-02-11 | End: 2020-02-11

## 2020-02-11 RX ADMIN — TETANUS TOXOID, REDUCED DIPHTHERIA TOXOID AND ACELLULAR PERTUSSIS VACCINE, ADSORBED 0.5 MILLILITER(S): 5; 2.5; 8; 8; 2.5 SUSPENSION INTRAMUSCULAR at 11:23

## 2020-02-11 RX ADMIN — Medication 0.1 MILLIGRAM(S): at 11:23

## 2020-02-11 RX ADMIN — AMLODIPINE BESYLATE 10 MILLIGRAM(S): 2.5 TABLET ORAL at 11:23

## 2020-02-11 NOTE — ED PROVIDER NOTE - PATIENT PORTAL LINK FT
You can access the FollowMyHealth Patient Portal offered by [facility] by registering at the following website: [email or web address]. By joining [employer]’s FollowMyHealth portal, you will also be able to view your health information using other applications (apps) compatible with our system.

## 2020-02-11 NOTE — ED ADULT TRIAGE NOTE - CHIEF COMPLAINT QUOTE
pt states he was bending down and when he stood up he hit his head on something on the wall. pt has lac to top of the head. pt denies LOC or blood thinners. pt states he has not been complaint with medication and did not take it this morning.

## 2020-02-11 NOTE — ED ADULT NURSE REASSESSMENT NOTE - NS ED NURSE REASSESS COMMENT FT1
Pt medicated with b/p meds , b/p high upon discharge home, MD made aware, no additional orders received, pt denies chest pain or headache at the time of discharge home

## 2020-02-11 NOTE — ED PROVIDER NOTE - CLINICAL SUMMARY MEDICAL DECISION MAKING FREE TEXT BOX
patient with small scalp laceration, cleaned, no suture/staple needed. no hematoma. no HA/dizziness/neurop deficits. patient HTN but noncompliant with meds, educated on importance of taking medications. will give here. update tetanus and dc

## 2020-02-11 NOTE — ED ADULT NURSE NOTE - CAS TRG GENERAL AIRWAY, MLM
Pt called reporting continued palpitations.  Pt is wear 30 day event monitor.  Pt has had a few strips showing -120s,.  Pt will start Metoprolol Succinate 25 mg daily.   Patent

## 2020-02-11 NOTE — ED PROVIDER NOTE - OBJECTIVE STATEMENT
38yo M bent down and stood up and caboinet was open hit head +bleeding now controlled. not on A/C. no HA/dizziness/vision changes. admits to being noncompliant with medications- should be on amlodipine and clonidine and several others, didn't take any today. no CP/SOB. here for laceration only. unknown last tdap

## 2020-02-11 NOTE — ED PROVIDER NOTE - PHYSICAL EXAMINATION
Gen: NAD, AOx3  Head: NC, <.5cm laceration top of occiput without hematoma  HEENT: EOMI, oral mucosa moist, normal conjunctiva, neck supple  Lung: no respiratory distress  CV:  Normal perfusion  Abd: soft, NTND  MSK: No edema, no visible deformities  Neuro: No focal neurologic deficits  Skin: No rash   Psych: normal affect Gen: NAD, AOx3  Head: NC, <.5cm laceration top of skull without hematoma  HEENT: EOMI, oral mucosa moist, normal conjunctiva, neck supple  Lung: no respiratory distress  CV:  Normal perfusion  MSK: No edema, no visible deformities  Neuro: No focal neurologic deficits  Skin: No rash   Psych: normal affect

## 2020-02-11 NOTE — ED PROVIDER NOTE - NSFOLLOWUPINSTRUCTIONS_ED_ALL_ED_FT
1. Return to ED for worsening, progressive or any other concerning symptoms   2. Follow up with your primary care doctor in 2-3days   3. TAKE ALL YOUR MEDICATIONS AS PRESCRIBED DAILY    Laceration    A laceration is a cut that goes through all of the layers of the skin and into the tissue that is right under the skin. Some lacerations heal on their own. Others need to be closed with skin adhesive strips, skin glue, stitches (sutures), or staples. Proper laceration care minimizes the risk of infection and helps the laceration to heal better.  If non-absorbable stitches or staples have been placed, they must be taken out within the time frame instructed by your healthcare provider.    SEEK IMMEDIATE MEDICAL CARE IF YOU HAVE ANY OF THE FOLLOWING SYMPTOMS: swelling around the wound, worsening pain, drainage from the wound, red streaking going away from your wound, inability to move finger or toe near the laceration, or discoloration of skin near the laceration.    Hypertension    Hypertension, commonly called high blood pressure, is when the force of blood pumping through your arteries is too strong. Hypertension forces your heart to work harder to pump blood. Your arteries may become narrow or stiff. Having untreated or uncontrolled hypertension for a long period of time can cause heart attack, stroke, kidney disease, and other problems. If started on a medication, take exactly as prescribed by your health care professional. Maintain a healthy lifestyle and follow up with your primary care physician.    SEEK IMMEDIATE MEDICAL CARE IF YOU HAVE ANY OF THE FOLLOWING SYMPTOMS: severe headache, confusion, chest pain, abdominal pain, vomiting, or shortness of breath.

## 2020-02-11 NOTE — ED ADULT NURSE NOTE - OBJECTIVE STATEMENT
Patient finished prednisone yesterday.  Per patient she still has leg pain.  Patient requested something else for the pain.  [facility] in [place]   pt states he was bending down and when he stood up he hit his head on something on the wall. pt has lac to top of the head. pt denies LOC or blood thinners. pt states he has not been complaint with medication and did not take it this morning.

## 2021-12-08 PROBLEM — E78.5 HYPERLIPIDEMIA, UNSPECIFIED: Chronic | Status: ACTIVE | Noted: 2020-02-11

## 2021-12-08 PROBLEM — E11.9 TYPE 2 DIABETES MELLITUS WITHOUT COMPLICATIONS: Chronic | Status: ACTIVE | Noted: 2020-02-11

## 2021-12-08 PROBLEM — I10 ESSENTIAL (PRIMARY) HYPERTENSION: Chronic | Status: ACTIVE | Noted: 2020-02-11

## 2022-03-09 ENCOUNTER — APPOINTMENT (OUTPATIENT)
Dept: INTERNAL MEDICINE | Facility: CLINIC | Age: 42
End: 2022-03-09

## 2022-03-23 ENCOUNTER — INPATIENT (INPATIENT)
Facility: HOSPITAL | Age: 42
LOS: 12 days | Discharge: HOME | DRG: 330 | End: 2022-04-05
Attending: STUDENT IN AN ORGANIZED HEALTH CARE EDUCATION/TRAINING PROGRAM | Admitting: HOSPITALIST
Payer: COMMERCIAL

## 2022-03-23 ENCOUNTER — TRANSCRIPTION ENCOUNTER (OUTPATIENT)
Age: 42
End: 2022-03-23

## 2022-03-23 VITALS
HEIGHT: 66 IN | HEART RATE: 100 BPM | RESPIRATION RATE: 18 BRPM | WEIGHT: 132.28 LBS | TEMPERATURE: 98 F | SYSTOLIC BLOOD PRESSURE: 163 MMHG | DIASTOLIC BLOOD PRESSURE: 93 MMHG

## 2022-03-23 DIAGNOSIS — D64.9 ANEMIA, UNSPECIFIED: ICD-10-CM

## 2022-03-23 LAB
ABO RH CONFIRMATION: SIGNIFICANT CHANGE UP
ALBUMIN SERPL ELPH-MCNC: 4 G/DL — SIGNIFICANT CHANGE UP (ref 3.3–5.2)
ALP SERPL-CCNC: 94 U/L — SIGNIFICANT CHANGE UP (ref 40–120)
ALT FLD-CCNC: 10 U/L — SIGNIFICANT CHANGE UP
ANION GAP SERPL CALC-SCNC: 17 MMOL/L — SIGNIFICANT CHANGE UP (ref 5–17)
ANISOCYTOSIS BLD QL: SIGNIFICANT CHANGE UP
APTT BLD: 32 SEC — SIGNIFICANT CHANGE UP (ref 27.5–35.5)
AST SERPL-CCNC: 10 U/L — SIGNIFICANT CHANGE UP
BASOPHILS # BLD AUTO: 0 K/UL — SIGNIFICANT CHANGE UP (ref 0–0.2)
BASOPHILS NFR BLD AUTO: 0 % — SIGNIFICANT CHANGE UP (ref 0–2)
BILIRUB SERPL-MCNC: <0.2 MG/DL — LOW (ref 0.4–2)
BLD GP AB SCN SERPL QL: SIGNIFICANT CHANGE UP
BUN SERPL-MCNC: 18.3 MG/DL — SIGNIFICANT CHANGE UP (ref 8–20)
CALCIUM SERPL-MCNC: 9 MG/DL — SIGNIFICANT CHANGE UP (ref 8.6–10.2)
CHLORIDE SERPL-SCNC: 104 MMOL/L — SIGNIFICANT CHANGE UP (ref 98–107)
CO2 SERPL-SCNC: 19 MMOL/L — LOW (ref 22–29)
CREAT SERPL-MCNC: 1.3 MG/DL — SIGNIFICANT CHANGE UP (ref 0.5–1.3)
EGFR: 70 ML/MIN/1.73M2 — SIGNIFICANT CHANGE UP
ELLIPTOCYTES BLD QL SMEAR: SLIGHT — SIGNIFICANT CHANGE UP
EOSINOPHIL # BLD AUTO: 0.09 K/UL — SIGNIFICANT CHANGE UP (ref 0–0.5)
EOSINOPHIL NFR BLD AUTO: 0.9 % — SIGNIFICANT CHANGE UP (ref 0–6)
FERRITIN SERPL-MCNC: 4 NG/ML — LOW (ref 30–400)
GIANT PLATELETS BLD QL SMEAR: PRESENT — SIGNIFICANT CHANGE UP
GLUCOSE SERPL-MCNC: 98 MG/DL — SIGNIFICANT CHANGE UP (ref 70–99)
HCT VFR BLD CALC: 20.5 % — CRITICAL LOW (ref 39–50)
HGB BLD-MCNC: 5.3 G/DL — CRITICAL LOW (ref 13–17)
HIV 1 & 2 AB SERPL IA.RAPID: SIGNIFICANT CHANGE UP
HYPOCHROMIA BLD QL: SIGNIFICANT CHANGE UP
INR BLD: 1.11 RATIO — SIGNIFICANT CHANGE UP (ref 0.88–1.16)
IRON SATN MFR SERPL: <9 UG/DL — LOW (ref 59–158)
IRON SATN MFR SERPL: SIGNIFICANT CHANGE UP % (ref 16–55)
LDH SERPL L TO P-CCNC: 208 U/L — HIGH (ref 98–192)
LYMPHOCYTES # BLD AUTO: 1.13 K/UL — SIGNIFICANT CHANGE UP (ref 1–3.3)
LYMPHOCYTES # BLD AUTO: 11.5 % — LOW (ref 13–44)
MANUAL SMEAR VERIFICATION: SIGNIFICANT CHANGE UP
MCHC RBC-ENTMCNC: 15.2 PG — LOW (ref 27–34)
MCHC RBC-ENTMCNC: 25.9 GM/DL — LOW (ref 32–36)
MCV RBC AUTO: 58.9 FL — LOW (ref 80–100)
MICROCYTES BLD QL: SIGNIFICANT CHANGE UP
MONOCYTES # BLD AUTO: 0.52 K/UL — SIGNIFICANT CHANGE UP (ref 0–0.9)
MONOCYTES NFR BLD AUTO: 5.3 % — SIGNIFICANT CHANGE UP (ref 2–14)
NEUTROPHILS # BLD AUTO: 8.07 K/UL — HIGH (ref 1.8–7.4)
NEUTROPHILS NFR BLD AUTO: 82.3 % — HIGH (ref 43–77)
OB PNL STL: NEGATIVE — SIGNIFICANT CHANGE UP
OVALOCYTES BLD QL SMEAR: SIGNIFICANT CHANGE UP
PLAT MORPH BLD: NORMAL — SIGNIFICANT CHANGE UP
PLATELET # BLD AUTO: 430 K/UL — HIGH (ref 150–400)
POIKILOCYTOSIS BLD QL AUTO: SIGNIFICANT CHANGE UP
POTASSIUM SERPL-MCNC: 4.3 MMOL/L — SIGNIFICANT CHANGE UP (ref 3.5–5.3)
POTASSIUM SERPL-SCNC: 4.3 MMOL/L — SIGNIFICANT CHANGE UP (ref 3.5–5.3)
PROT SERPL-MCNC: 7.2 G/DL — SIGNIFICANT CHANGE UP (ref 6.6–8.7)
PROTHROM AB SERPL-ACNC: 12.9 SEC — SIGNIFICANT CHANGE UP (ref 10.5–13.4)
RAPID RVP RESULT: SIGNIFICANT CHANGE UP
RBC # BLD: 3.48 M/UL — LOW (ref 4.2–5.8)
RBC # BLD: 3.58 M/UL — LOW (ref 4.2–5.8)
RBC # FLD: 19.7 % — HIGH (ref 10.3–14.5)
RBC BLD AUTO: ABNORMAL
RETICS #: 69.5 K/UL — SIGNIFICANT CHANGE UP (ref 25–125)
RETICS/RBC NFR: 1.9 % — SIGNIFICANT CHANGE UP (ref 0.5–2.5)
SARS-COV-2 RNA SPEC QL NAA+PROBE: SIGNIFICANT CHANGE UP
SODIUM SERPL-SCNC: 139 MMOL/L — SIGNIFICANT CHANGE UP (ref 135–145)
STOMATOCYTES BLD QL SMEAR: SLIGHT — SIGNIFICANT CHANGE UP
TARGETS BLD QL SMEAR: SLIGHT — SIGNIFICANT CHANGE UP
TIBC SERPL-MCNC: 432 UG/DL — HIGH (ref 220–430)
TRANSFERRIN SERPL-MCNC: 302 MG/DL — SIGNIFICANT CHANGE UP (ref 180–329)
WBC # BLD: 9.81 K/UL — SIGNIFICANT CHANGE UP (ref 3.8–10.5)
WBC # FLD AUTO: 9.81 K/UL — SIGNIFICANT CHANGE UP (ref 3.8–10.5)

## 2022-03-23 PROCEDURE — 99285 EMERGENCY DEPT VISIT HI MDM: CPT

## 2022-03-23 PROCEDURE — 93010 ELECTROCARDIOGRAM REPORT: CPT

## 2022-03-23 PROCEDURE — 99223 1ST HOSP IP/OBS HIGH 75: CPT

## 2022-03-23 RX ORDER — SODIUM CHLORIDE 9 MG/ML
3 INJECTION INTRAMUSCULAR; INTRAVENOUS; SUBCUTANEOUS EVERY 8 HOURS
Refills: 0 | Status: DISCONTINUED | OUTPATIENT
Start: 2022-03-23 | End: 2022-04-01

## 2022-03-23 RX ORDER — ONDANSETRON 8 MG/1
4 TABLET, FILM COATED ORAL EVERY 8 HOURS
Refills: 0 | Status: DISCONTINUED | OUTPATIENT
Start: 2022-03-23 | End: 2022-04-01

## 2022-03-23 RX ORDER — FOLIC ACID 0.8 MG
1 TABLET ORAL DAILY
Refills: 0 | Status: DISCONTINUED | OUTPATIENT
Start: 2022-03-23 | End: 2022-04-01

## 2022-03-23 RX ORDER — AMLODIPINE BESYLATE 2.5 MG/1
10 TABLET ORAL DAILY
Refills: 0 | Status: DISCONTINUED | OUTPATIENT
Start: 2022-03-23 | End: 2022-04-01

## 2022-03-23 RX ORDER — LABETALOL HCL 100 MG
200 TABLET ORAL THREE TIMES A DAY
Refills: 0 | Status: DISCONTINUED | OUTPATIENT
Start: 2022-03-23 | End: 2022-03-29

## 2022-03-23 RX ORDER — IRON SUCROSE 20 MG/ML
200 INJECTION, SOLUTION INTRAVENOUS ONCE
Refills: 0 | Status: COMPLETED | OUTPATIENT
Start: 2022-03-23 | End: 2022-03-24

## 2022-03-23 RX ORDER — ATORVASTATIN CALCIUM 80 MG/1
20 TABLET, FILM COATED ORAL AT BEDTIME
Refills: 0 | Status: DISCONTINUED | OUTPATIENT
Start: 2022-03-23 | End: 2022-04-01

## 2022-03-23 RX ORDER — LANOLIN ALCOHOL/MO/W.PET/CERES
3 CREAM (GRAM) TOPICAL AT BEDTIME
Refills: 0 | Status: DISCONTINUED | OUTPATIENT
Start: 2022-03-23 | End: 2022-04-01

## 2022-03-23 RX ORDER — ACETAMINOPHEN 500 MG
650 TABLET ORAL EVERY 6 HOURS
Refills: 0 | Status: DISCONTINUED | OUTPATIENT
Start: 2022-03-23 | End: 2022-04-01

## 2022-03-23 RX ADMIN — ATORVASTATIN CALCIUM 20 MILLIGRAM(S): 80 TABLET, FILM COATED ORAL at 22:36

## 2022-03-23 RX ADMIN — AMLODIPINE BESYLATE 10 MILLIGRAM(S): 2.5 TABLET ORAL at 22:37

## 2022-03-23 RX ADMIN — SODIUM CHLORIDE 3 MILLILITER(S): 9 INJECTION INTRAMUSCULAR; INTRAVENOUS; SUBCUTANEOUS at 21:35

## 2022-03-23 RX ADMIN — Medication 200 MILLIGRAM(S): at 22:37

## 2022-03-23 NOTE — H&P ADULT - HISTORY OF PRESENT ILLNESS
40 y/o male with history of HTN/DM-2, sent in by PMD after o/p blood work showed severe anemia with a Hbg of 5.3. Patient was c/o feeling weak and dizzy at times when standing up. Denies any history of anemia or family history of blood disorders. Denies any BRBPR or dark stools, no vomiting blood or hematuria. He is not using any NSAIDs. Denies ever having an EGD or Colonoscopy. In the ED Hbg confirmed at 5.3, vitals stable with exception of elevated BP, but patient admits to being off all his medications for the past year. His indicis and iron studies c/w profound iron def anemia. Stool brown and occult neg. Denies CP, Fever, chills, N/V, abdominal pain.

## 2022-03-23 NOTE — ED ADULT NURSE NOTE - OBJECTIVE STATEMENT
41 year old male PMH of DM2, HTN . pt was sent over by Dr. Mckinnon for HGB of 5.5. pt reports that he is dizzy "sometimes". pt denies chest pain, pt denies SOB

## 2022-03-23 NOTE — ED PROVIDER NOTE - PHYSICAL EXAMINATION
Gen: No acute distress, non toxic. pale  HEENT: Mucous membranes moist, pale conjunctivae, EOMI  CV: RRR, nl s1/s2.  Resp: CTAB, normal rate and effort  GI: Abdomen soft, NT, ND. No rebound, no guarding. brown stool on rectal chaperoned by ZENAIDA Donohue  : No CVAT  Neuro: A&O x 3, moving all 4 extremities  MSK: No spine or joint tenderness to palpation  Skin: No rashes. intact and perfused.

## 2022-03-23 NOTE — ED ADULT NURSE NOTE - NSIMPLEMENTINTERV_GEN_ALL_ED
Implemented All Fall with Harm Risk Interventions:  [place] to call system. Call bell, personal items and telephone within reach. Instruct patient to call for assistance. Room bathroom lighting operational. Non-slip footwear when patient is off stretcher. Physically safe environment: no spills, clutter or unnecessary equipment. Stretcher in lowest position, wheels locked, appropriate side rails in place. Provide visual cue, wrist band, yellow gown, etc. Monitor gait and stability. Monitor for mental status changes and reorient to person, place, and time. Review medications for side effects contributing to fall risk. Reinforce activity limits and safety measures with patient and family. Provide visual clues: red socks.

## 2022-03-23 NOTE — H&P ADULT - ASSESSMENT
40 y/o male with profound iron deficiency anemia, uncontrolled HTN, Hx of DM-2, HLD    Iron Def anemia:  -Iron studies c/w chronic blood loss  -F/U haptoglobin with mild elevation of LDH  -Other cell lines normal  -Retic count abnormally normal likely from severe iron store depletion   -Occult neg, but likely etiology from chronic GI blood loss  -Will need EGD/Colonoscopy for further evaluation  -Transfuse PRBCs and add IV iron, FA, MVI  -Check post transfusion CBC  -Add PPI  -GI consult called    HTN:  -Was previously on 5 agents for BP control, but admits self discontinuing over a year ago  -Will re-start part of prior regimen to avoid precipitous drop in BP  -DASH diet    HLD:  -Re-start statin    DM-2:  -Check A1c  -Serum glucose normal off medications     Discussed with ED staff, Patient and Wife at bedside who all agree with above plan of care.

## 2022-03-23 NOTE — ED PROVIDER NOTE - OBJECTIVE STATEMENT
42 y/o male hx hx htn, dm, sent for anemia. States has some orthostatic lightheadedness, 2 months of mild lower abd pain. no other complaints. no brbpr/melena, no hematemesis, no a/c. no cp/sob. no known hx anemia or blood disorder.     ROS: No fever/chills. No eye pain/changes in vision, No ear pain/sore throat/dysphagia, No chest pain/palpitations. No SOB/cough/. No abdominal pain, N/V/D, no black/bloody bm. No dysuria/frequency/discharge, No headache. No Dizziness.    No rashes or breaks in skin. No numbness/tingling/weakness.

## 2022-03-24 DIAGNOSIS — D64.9 ANEMIA, UNSPECIFIED: ICD-10-CM

## 2022-03-24 LAB
ANION GAP SERPL CALC-SCNC: 14 MMOL/L — SIGNIFICANT CHANGE UP (ref 5–17)
BUN SERPL-MCNC: 16.7 MG/DL — SIGNIFICANT CHANGE UP (ref 8–20)
CALCIUM SERPL-MCNC: 8.9 MG/DL — SIGNIFICANT CHANGE UP (ref 8.6–10.2)
CHLORIDE SERPL-SCNC: 104 MMOL/L — SIGNIFICANT CHANGE UP (ref 98–107)
CO2 SERPL-SCNC: 20 MMOL/L — LOW (ref 22–29)
CREAT SERPL-MCNC: 1.18 MG/DL — SIGNIFICANT CHANGE UP (ref 0.5–1.3)
EGFR: 79 ML/MIN/1.73M2 — SIGNIFICANT CHANGE UP
GLUCOSE SERPL-MCNC: 127 MG/DL — HIGH (ref 70–99)
HAPTOGLOB SERPL-MCNC: 343 MG/DL — HIGH (ref 34–200)
HCT VFR BLD CALC: 24.8 % — LOW (ref 39–50)
HCT VFR BLD CALC: 27.2 % — LOW (ref 39–50)
HGB BLD-MCNC: 7 G/DL — CRITICAL LOW (ref 13–17)
HGB BLD-MCNC: 7.9 G/DL — LOW (ref 13–17)
MAGNESIUM SERPL-MCNC: 2 MG/DL — SIGNIFICANT CHANGE UP (ref 1.6–2.6)
MCHC RBC-ENTMCNC: 17.9 PG — LOW (ref 27–34)
MCHC RBC-ENTMCNC: 18.8 PG — LOW (ref 27–34)
MCHC RBC-ENTMCNC: 28.2 GM/DL — LOW (ref 32–36)
MCHC RBC-ENTMCNC: 29 GM/DL — LOW (ref 32–36)
MCV RBC AUTO: 63.4 FL — LOW (ref 80–100)
MCV RBC AUTO: 64.8 FL — LOW (ref 80–100)
PHOSPHATE SERPL-MCNC: 4 MG/DL — SIGNIFICANT CHANGE UP (ref 2.4–4.7)
PLATELET # BLD AUTO: 363 K/UL — SIGNIFICANT CHANGE UP (ref 150–400)
PLATELET # BLD AUTO: 404 K/UL — HIGH (ref 150–400)
POTASSIUM SERPL-MCNC: 4 MMOL/L — SIGNIFICANT CHANGE UP (ref 3.5–5.3)
POTASSIUM SERPL-SCNC: 4 MMOL/L — SIGNIFICANT CHANGE UP (ref 3.5–5.3)
RBC # BLD: 3.91 M/UL — LOW (ref 4.2–5.8)
RBC # BLD: 4.2 M/UL — SIGNIFICANT CHANGE UP (ref 4.2–5.8)
RBC # FLD: 24.5 % — HIGH (ref 10.3–14.5)
RBC # FLD: 25.1 % — HIGH (ref 10.3–14.5)
SODIUM SERPL-SCNC: 138 MMOL/L — SIGNIFICANT CHANGE UP (ref 135–145)
WBC # BLD: 8.32 K/UL — SIGNIFICANT CHANGE UP (ref 3.8–10.5)
WBC # BLD: 9.78 K/UL — SIGNIFICANT CHANGE UP (ref 3.8–10.5)
WBC # FLD AUTO: 8.32 K/UL — SIGNIFICANT CHANGE UP (ref 3.8–10.5)
WBC # FLD AUTO: 9.78 K/UL — SIGNIFICANT CHANGE UP (ref 3.8–10.5)

## 2022-03-24 PROCEDURE — 99232 SBSQ HOSP IP/OBS MODERATE 35: CPT

## 2022-03-24 PROCEDURE — 99233 SBSQ HOSP IP/OBS HIGH 50: CPT

## 2022-03-24 PROCEDURE — 99253 IP/OBS CNSLTJ NEW/EST LOW 45: CPT

## 2022-03-24 PROCEDURE — 74177 CT ABD & PELVIS W/CONTRAST: CPT | Mod: 26

## 2022-03-24 RX ORDER — PANTOPRAZOLE SODIUM 20 MG/1
40 TABLET, DELAYED RELEASE ORAL EVERY 12 HOURS
Refills: 0 | Status: DISCONTINUED | OUTPATIENT
Start: 2022-03-24 | End: 2022-03-28

## 2022-03-24 RX ORDER — MULTIVIT WITH MIN/MFOLATE/K2 340-15/3 G
1 POWDER (GRAM) ORAL ONCE
Refills: 0 | Status: COMPLETED | OUTPATIENT
Start: 2022-03-24 | End: 2022-03-24

## 2022-03-24 RX ORDER — PANTOPRAZOLE SODIUM 20 MG/1
40 TABLET, DELAYED RELEASE ORAL
Refills: 0 | Status: DISCONTINUED | OUTPATIENT
Start: 2022-03-24 | End: 2022-03-24

## 2022-03-24 RX ORDER — SOD SULF/SODIUM/NAHCO3/KCL/PEG
4000 SOLUTION, RECONSTITUTED, ORAL ORAL ONCE
Refills: 0 | Status: COMPLETED | OUTPATIENT
Start: 2022-03-24 | End: 2022-03-24

## 2022-03-24 RX ADMIN — Medication 200 MILLIGRAM(S): at 13:37

## 2022-03-24 RX ADMIN — Medication 650 MILLIGRAM(S): at 22:28

## 2022-03-24 RX ADMIN — SODIUM CHLORIDE 3 MILLILITER(S): 9 INJECTION INTRAMUSCULAR; INTRAVENOUS; SUBCUTANEOUS at 11:49

## 2022-03-24 RX ADMIN — AMLODIPINE BESYLATE 10 MILLIGRAM(S): 2.5 TABLET ORAL at 05:28

## 2022-03-24 RX ADMIN — Medication 1 TABLET(S): at 13:35

## 2022-03-24 RX ADMIN — IRON SUCROSE 220 MILLIGRAM(S): 20 INJECTION, SOLUTION INTRAVENOUS at 05:52

## 2022-03-24 RX ADMIN — PANTOPRAZOLE SODIUM 40 MILLIGRAM(S): 20 TABLET, DELAYED RELEASE ORAL at 21:28

## 2022-03-24 RX ADMIN — Medication 200 MILLIGRAM(S): at 05:28

## 2022-03-24 RX ADMIN — SODIUM CHLORIDE 3 MILLILITER(S): 9 INJECTION INTRAMUSCULAR; INTRAVENOUS; SUBCUTANEOUS at 22:17

## 2022-03-24 RX ADMIN — SODIUM CHLORIDE 3 MILLILITER(S): 9 INJECTION INTRAMUSCULAR; INTRAVENOUS; SUBCUTANEOUS at 05:27

## 2022-03-24 RX ADMIN — Medication 1 MILLIGRAM(S): at 13:35

## 2022-03-24 RX ADMIN — Medication 200 MILLIGRAM(S): at 21:29

## 2022-03-24 RX ADMIN — Medication 650 MILLIGRAM(S): at 21:28

## 2022-03-24 RX ADMIN — Medication 30 MILLILITER(S): at 21:29

## 2022-03-24 RX ADMIN — ATORVASTATIN CALCIUM 20 MILLIGRAM(S): 80 TABLET, FILM COATED ORAL at 21:29

## 2022-03-24 NOTE — PROGRESS NOTE ADULT - ASSESSMENT
42 y/o male with profound iron deficiency anemia, uncontrolled HTN, Hx of DM-2, HLD    BLANCA  - Hgb improved to 7 s/p 2 units pRBC, will order 1 additional  unit  - GI consulted  - CT ordered  - Monitor CBC, transfuse prn  - PPI    HTN  - Was previously on 5 agents for BP control, but admits self discontinuing over a year ago  - Will re-start part of prior regimen to avoid precipitous drop in BP  - DASH diet    HLD  - Continue statin    DM-2  - Monitor off medications

## 2022-03-24 NOTE — CONSULT NOTE ADULT - NS ATTEND AMEND GEN_ALL_CORE FT
41 year old man with a significant past medical history of HTN, DM2 non complaint with meds, who arrived to [facility] sent by out patient lab for hemoglobin of 5.3. No melena/ hematochezia, ADINA showed brown stool, Iron panel shows BLANCA, no prior egd/ cscope, denies any family h/o GI malignancy, also report lower abd pain and weight loss , will get CT abdomen .Check ost transfusion h/h, PPI IV bid, clears today, npo after midnight for tentative egd/ cscope tomorrow.     labs/notes reviewed, plan discussed with GI NP

## 2022-03-24 NOTE — PROGRESS NOTE ADULT - SUBJECTIVE AND OBJECTIVE BOX
[facility] Division of Hospital Medicine  Juan Diego Presley MD    Chief Complaint:  Patient is a 41y old  Male who presents with a chief complaint of Symptomatic anemia (24 Mar 2022 08:34)      SUBJECTIVE / OVERNIGHT EVENTS:  Patient seen and examined at bedside. No acute events reported overnight. No new complaints.    Patient denies chest pain, SOB, abd pain, N/V, fever, chills, dysuria or any other complaints. All remainder ROS negative.     MEDICATIONS  (STANDING):  amLODIPine   Tablet 10 milliGRAM(s) Oral daily  atorvastatin 20 milliGRAM(s) Oral at bedtime  folic acid 1 milliGRAM(s) Oral daily  labetalol 200 milliGRAM(s) Oral three times a day  multivitamin 1 Tablet(s) Oral daily  pantoprazole    Tablet 40 milliGRAM(s) Oral before breakfast  sodium chloride 0.9% lock flush 3 milliLiter(s) IV Push every 8 hours    MEDICATIONS  (PRN):  acetaminophen     Tablet .. 650 milliGRAM(s) Oral every 6 hours PRN Temp greater or equal to 38C (100.4F), Mild Pain (1 - 3)  aluminum hydroxide/magnesium hydroxide/simethicone Suspension 30 milliLiter(s) Oral every 4 hours PRN Dyspepsia  melatonin 3 milliGRAM(s) Oral at bedtime PRN Insomnia  ondansetron Injectable 4 milliGRAM(s) IV Push every 8 hours PRN Nausea and/or Vomiting        I&O's Summary      PHYSICAL EXAM:  Vital Signs Last 24 Hrs  T(C): 37 (24 Mar 2022 10:01), Max: 37.3 (23 Mar 2022 20:35)  T(F): 98.6 (24 Mar 2022 10:01), Max: 99.1 (23 Mar 2022 20:35)  HR: 77 (24 Mar 2022 10:01) (75 - 100)  BP: 136/74 (24 Mar 2022 10:01) (133/78 - 184/104)  BP(mean): 119 (23 Mar 2022 22:49) (119 - 119)  RR: 18 (24 Mar 2022 10:01) (16 - 18)  SpO2: 99% (24 Mar 2022 10:01) (98% - 100%)        CONSTITUTIONAL: NAD  HEENT: NC/AT, PERRL, no JVD  RESPIRATORY: CTA bilaterally, normal effort  CARDIOVASCULAR: RRR, S1/S2+, no m/g/r  ABDOMEN: Nontender to palpation, normoactive bowel sounds, no rebound/guarding; No hepatosplenomegaly  MUSCULOSKELETAL: No edema, cyanosis or deformities.  PSYCH: A+O to person, place, and time; affect appropriate  NEUROLOGY: CN 2-12 are intact and symmetric; no gross neurological deficits.  SKIN: No rashes; no palpable lesions  VASC: Distal pulses palpable    LABS:                        7.0    8.32  )-----------( 363      ( 24 Mar 2022 07:41 )             24.8     03-24    138  |  104  |  16.7  ----------------------------<  127<H>  4.0   |  20.0<L>  |  1.18    Ca    8.9      24 Mar 2022 07:41  Phos  4.0     03-24  Mg     2.0     03-24    TPro  7.2  /  Alb  4.0  /  TBili  <0.2<L>  /  DBili  x   /  AST  10  /  ALT  10  /  AlkPhos  94  03-23    PT/INR - ( 23 Mar 2022 16:55 )   PT: 12.9 sec;   INR: 1.11 ratio         PTT - ( 23 Mar 2022 16:55 )  PTT:32.0 sec          CAPILLARY BLOOD GLUCOSE            RADIOLOGY & ADDITIONAL TESTS:  Results Reviewed:   Imaging Personally Reviewed:  Electrocardiogram Personally Reviewed:

## 2022-03-24 NOTE — CONSULT NOTE ADULT - SUBJECTIVE AND OBJECTIVE BOX
Patient is a 41y old  Male who presents with a chief complaint of Symptomatic anemia (23 Mar 2022 22:49)      HPI: This is a 41 year old man with a significant past medical history of HTN, DM2 non complaint with meds, who arrived to [facility] sent by out patient lab for hemoglobin of 5.3.  Patient went to PMD 2 weeks ago complaining of lightheadedness and was sent for blood work, had blood work done on Saturday and received a called on 03/23 to go to ED. As per patient for the past month, he has been experiencing lightheadedness upon changing positions, and intermittent lower abdominal pain with no aggravating or alleviating factors for the last 4 months. Patient also reporting 20Lbs weight loss in the last 3 months. Patient stating "I was always told that I produced lots of RBC's, and I usually donate Blood yearly". Denies ever having an EGD or Colonoscopy, his indicis and iron studies c/w profound iron deficiency anemia. Now s/p 2 units of PRBC's hemoglobin uptrend to 7. Denies use of NSAIDs,  nausea, vomiting, abdominal pain, chest pain, shortness of breath, hematemesis, hematochezia, melena.      PAST MEDICAL & SURGICAL HISTORY:  HTN (hypertension)    DM (diabetes mellitus)    HLD (hyperlipidemia)    No significant past surgical history        Allergies    No Known Allergies    Intolerances        MEDICATIONS  (STANDING):  amLODIPine   Tablet 10 milliGRAM(s) Oral daily  atorvastatin 20 milliGRAM(s) Oral at bedtime  folic acid 1 milliGRAM(s) Oral daily  labetalol 200 milliGRAM(s) Oral three times a day  multivitamin 1 Tablet(s) Oral daily  sodium chloride 0.9% lock flush 3 milliLiter(s) IV Push every 8 hours    MEDICATIONS  (PRN):  acetaminophen     Tablet .. 650 milliGRAM(s) Oral every 6 hours PRN Temp greater or equal to 38C (100.4F), Mild Pain (1 - 3)  aluminum hydroxide/magnesium hydroxide/simethicone Suspension 30 milliLiter(s) Oral every 4 hours PRN Dyspepsia  melatonin 3 milliGRAM(s) Oral at bedtime PRN Insomnia  ondansetron Injectable 4 milliGRAM(s) IV Push every 8 hours PRN Nausea and/or Vomiting      Social History:    Marital Status:  ( X  )    (   ) Single    (   )    (  )   Occupation:   Lives with: (  ) alone  (  ) children   ( X ) spouse   (  ) parents  (  ) other    Substance Use (street drugs): (  ) never used  (  ) other:  Tobacco Usage:  (   ) never smoked   (   ) former smoker   (   ) current smoker  (     ) pack year  (        ) last cigarette date  Alcohol Usage:  Sexual History:     Family History   IBD (  ) Yes   ( X ) No  GI Malignancy (  )  Yes    ( X ) No    Health Management     Last Colonoscopy - Never      (     ) Advanced Directives: (     ) None    (      ) DNR    (     ) DNI    (     ) Health Care Proxy:     Review of Systems:  · ENMT: negative  · Respiratory and Thorax: negative  · Cardiovascular: negative  · Gastrointestinal: see above.  · Genitourinary:	negative  · Musculoskeletal: negative  · Neurological: negative  · Psychiatric: negative  · Hematology/Lymphatics: negative  · Endocrine: negative      Vital Signs Last 24 Hrs  T(C): 36.8 (24 Mar 2022 04:54), Max: 37.3 (23 Mar 2022 20:35)  T(F): 98.2 (24 Mar 2022 04:54), Max: 99.1 (23 Mar 2022 20:35)  HR: 75 (24 Mar 2022 04:54) (75 - 100)  BP: 154/84 (24 Mar 2022 04:54) (133/78 - 184/104)  BP(mean): 119 (23 Mar 2022 22:49) (119 - 119)  RR: 18 (24 Mar 2022 04:54) (16 - 18)  SpO2: 100% (24 Mar 2022 04:54) (98% - 100%)    PHYSICAL EXAM:  · Constitutional:   man, in no acute distress.   · Eyes: EOMI; PERRL; no drainage or redness  · ENMT: No oral lesions; no gross abnormalities  · Neck:	No bruits; no thyromegaly or nodules  · Back:	No deformity or limitation of movement  · Respiratory: Breath Sounds equal & clear to percussion & auscultation, no accessory muscle use  · Cardiovascular: Regular rate & rhythm, normal S1, S2; no murmurs, gallops or rubs; no S3, S4  · Gastrointestinal: Soft, non-tender, no hepatosplenomegaly, normal bowel sounds      LABS:                        7.0    8.32  )-----------( 363      ( 24 Mar 2022 07:41 )             24.8     03-24    138  |  104  |  16.7  ----------------------------<  127<H>  4.0   |  20.0<L>  |  1.18    Ca    8.9      24 Mar 2022 07:41  Phos  4.0     03-24  Mg     2.0     03-24    TPro  7.2  /  Alb  4.0  /  TBili  <0.2<L>  /  DBili  x   /  AST  10  /  ALT  10  /  AlkPhos  94  03-23    PT/INR - ( 23 Mar 2022 16:55 )   PT: 12.9 sec;   INR: 1.11 ratio         PTT - ( 23 Mar 2022 16:55 )  PTT:32.0 sec    LIVER FUNCTIONS - ( 23 Mar 2022 16:55 )  Alb: 4.0 g/dL / Pro: 7.2 g/dL / ALK PHOS: 94 U/L / ALT: 10 U/L / AST: 10 U/L / GGT: x

## 2022-03-24 NOTE — CONSULT NOTE ADULT - PROBLEM SELECTOR RECOMMENDATION 9
Microcytic anemia, With low iron studies indices possible secondary to slow GI bleed, deficient diet, iron sequestration. We could consider anemia of chronic disease.  Will schedule EGD/Colon to r/o celiac vs H Pylori, GI bleed, malignancy.  Continue iron therapy  Continue to monitor hemoglobin and transfuse if hemoglobin < 7 Microcytic anemia, With low iron studies indices possible secondary to slow GI bleed, deficient diet, iron sequestration. We could consider anemia of chronic disease.  Will schedule EGD/Colon to r/o celiac vs H Pylori, GI bleed, malignancy.  Continue iron therapy  Continue to monitor hemoglobin and transfuse as needed

## 2022-03-24 NOTE — PATIENT PROFILE ADULT - FUNCTIONAL ASSESSMENT - BASIC MOBILITY 2.
Time Seen by Provider: 19 09:22


Chief Complaint (Nursing): Chest Pain


History Per: Patient, Family (mother)


Onset/Duration Of Symptoms: Days (1)


Current Symptoms Are (Timing): Still Present


Severity: Moderate


Additional History Per: Prior Records





PMH


Reviewed: Historical Data, Nursing Documentation, Vital Signs





- Medical History


PMH: No Chronic Diseases





- Surgical History


Surgical History: No Surg Hx





Review Of Systems


Except As Marked, All Systems Reviewed And Found Negative.


Constitutional: Negative for: Fever, Weakness


ENT: Negative for: Throat Pain


Respiratory: Negative for: Cough


Gastrointestinal: Negative for: Vomiting, Diarrhea


Musculoskeletal: Negative for: Neck Pain


Skin: Negative for: Rash


Neurological: Negative for: Weakness, Seizures, Altered Mental Status





Pedatric Physical Exam





- Physical Exam


Appears: Non-toxic, No Acute Distress, Interacting


Skin: Normal Color, Warm, Dry, No Rash


Head: Atraumatic, Normacephalic


Eye(s): bilateral: Normal Inspection, PERRL, EOMI


Throat: Normal


Neck: Normal ROM, Supple


Chest: Symmetrical


Cardiovascular: Rhythm Regular


Respiratory: Normal Breath Sounds, No Accessory Muscle Use


Gastrointestinal/Abdominal: Soft, No Distention


Extremity: Normal ROM


Neurological/Psych: Normal Speech, Normal Cognition





ED Course And Treatment


ECG: Interpreted By Me, Viewed By Me


ECG Rhythm: Sinus Rhythm, Nonspecific Changes


ECG Interpretation: No Acute Changes


Rate From EC


O2 Sat by Pulse Oximetry: 99


Pulse Ox Interpretation: Normal





- Radiology


CXR: Interpreted by Me, Viewed By Me


CXR Interpretation: Yes: No Acute Disease





Disposition


Counseled Patient/Family Regarding: Studies Performed, Diagnosis, Need For 

Followup





- Disposition


Referrals: 


Cr John MD [Staff Provider] - 


Disposition: HOME/ ROUTINE


Disposition Time: 10:49


Condition: STABLE


Additional Instructions: 


Give Ibuprofen as needed for pain. Follow up with his pediatrician this week for

further evaluation. Return to the ER if he develop fever, trouble breathing, 

worsening of symptoms or if you have any other concerns.


Instructions:  Chest Pain in Children and Teens (DC)


Forms:  [facility] (British)


Print Language: Ethiopian





- Clinical Impression


Clinical Impression: 


 Chest pain
4 = No assist / stand by assistance

## 2022-03-24 NOTE — PATIENT PROFILE ADULT - FALL HARM RISK - UNIVERSAL INTERVENTIONS
Bed in lowest position, wheels locked, appropriate side rails in place/Call bell, personal items and telephone in reach/Instruct patient to call for assistance before getting out of bed or chair/Non-slip footwear when patient is out of bed/[place] to call system/Physically safe environment - no spills, clutter or unnecessary equipment/Purposeful Proactive Rounding/Room/bathroom lighting operational, light cord in reach

## 2022-03-25 LAB
A1C WITH ESTIMATED AVERAGE GLUCOSE RESULT: 6.3 % — HIGH (ref 4–5.6)
CEA SERPL-MCNC: 4.9 NG/ML — HIGH (ref 0–3.8)
ESTIMATED AVERAGE GLUCOSE: 134 MG/DL — HIGH (ref 68–114)

## 2022-03-25 PROCEDURE — 99233 SBSQ HOSP IP/OBS HIGH 50: CPT

## 2022-03-25 RX ORDER — MULTIVIT WITH MIN/MFOLATE/K2 340-15/3 G
1 POWDER (GRAM) ORAL ONCE
Refills: 0 | Status: COMPLETED | OUTPATIENT
Start: 2022-03-26 | End: 2022-03-26

## 2022-03-25 RX ORDER — IRON SUCROSE 20 MG/ML
200 INJECTION, SOLUTION INTRAVENOUS ONCE
Refills: 0 | Status: COMPLETED | OUTPATIENT
Start: 2022-03-25 | End: 2022-03-26

## 2022-03-25 RX ORDER — SOD SULF/SODIUM/NAHCO3/KCL/PEG
4000 SOLUTION, RECONSTITUTED, ORAL ORAL ONCE
Refills: 0 | Status: COMPLETED | OUTPATIENT
Start: 2022-03-27 | End: 2022-03-27

## 2022-03-25 RX ADMIN — SODIUM CHLORIDE 3 MILLILITER(S): 9 INJECTION INTRAMUSCULAR; INTRAVENOUS; SUBCUTANEOUS at 05:45

## 2022-03-25 RX ADMIN — Medication 1 TABLET(S): at 11:21

## 2022-03-25 RX ADMIN — Medication 1 MILLIGRAM(S): at 11:20

## 2022-03-25 RX ADMIN — Medication 10 MILLIGRAM(S): at 21:28

## 2022-03-25 RX ADMIN — Medication 200 MILLIGRAM(S): at 11:21

## 2022-03-25 RX ADMIN — ATORVASTATIN CALCIUM 20 MILLIGRAM(S): 80 TABLET, FILM COATED ORAL at 21:30

## 2022-03-25 RX ADMIN — PANTOPRAZOLE SODIUM 40 MILLIGRAM(S): 20 TABLET, DELAYED RELEASE ORAL at 17:35

## 2022-03-25 RX ADMIN — Medication 200 MILLIGRAM(S): at 21:29

## 2022-03-25 RX ADMIN — SODIUM CHLORIDE 3 MILLILITER(S): 9 INJECTION INTRAMUSCULAR; INTRAVENOUS; SUBCUTANEOUS at 11:19

## 2022-03-25 RX ADMIN — SODIUM CHLORIDE 3 MILLILITER(S): 9 INJECTION INTRAMUSCULAR; INTRAVENOUS; SUBCUTANEOUS at 21:30

## 2022-03-25 NOTE — PROGRESS NOTE ADULT - SUBJECTIVE AND OBJECTIVE BOX
[facility] Division of Hospital Medicine  Juan Diego Presley MD    Chief Complaint:  Patient is a 41y old  Male who presents with a chief complaint of Symptomatic anemia (25 Mar 2022 08:27)      SUBJECTIVE / OVERNIGHT EVENTS:  Patient seen and examined at bedside. No acute events reported overnight. CT showed colonic mass/intusseception/LBO. Seen by Surgery, no acute surgical interventions recommended at this time. Procedure cancelled today as patient will need slow prep over weekend. Complains of intermittent abdominal pain.    Patient denies chest pain, SOB, N/V, fever, chills, dysuria or any other complaints. All remainder ROS negative.     MEDICATIONS  (STANDING):  amLODIPine   Tablet 10 milliGRAM(s) Oral daily  atorvastatin 20 milliGRAM(s) Oral at bedtime  folic acid 1 milliGRAM(s) Oral daily  labetalol 200 milliGRAM(s) Oral three times a day  multivitamin 1 Tablet(s) Oral daily  pantoprazole  Injectable 40 milliGRAM(s) IV Push every 12 hours  sodium chloride 0.9% lock flush 3 milliLiter(s) IV Push every 8 hours    MEDICATIONS  (PRN):  acetaminophen     Tablet .. 650 milliGRAM(s) Oral every 6 hours PRN Temp greater or equal to 38C (100.4F), Mild Pain (1 - 3)  aluminum hydroxide/magnesium hydroxide/simethicone Suspension 30 milliLiter(s) Oral every 4 hours PRN Dyspepsia  melatonin 3 milliGRAM(s) Oral at bedtime PRN Insomnia  ondansetron Injectable 4 milliGRAM(s) IV Push every 8 hours PRN Nausea and/or Vomiting        I&O's Summary      PHYSICAL EXAM:  Vital Signs Last 24 Hrs  T(C): 36.5 (25 Mar 2022 04:17), Max: 36.8 (24 Mar 2022 10:30)  T(F): 97.7 (25 Mar 2022 04:17), Max: 98.3 (24 Mar 2022 10:59)  HR: 67 (25 Mar 2022 04:17) (67 - 81)  BP: 125/75 (25 Mar 2022 04:17) (125/75 - 155/82)  BP(mean): --  RR: 18 (25 Mar 2022 04:17) (18 - 18)  SpO2: 99% (25 Mar 2022 04:17) (98% - 100%)        CONSTITUTIONAL: NAD  HEENT: NC/AT, PERRL, no JVD  RESPIRATORY: CTA bilaterally, normal effort  CARDIOVASCULAR: RRR, S1/S2+, no m/g/r  ABDOMEN: Nontender to palpation, normoactive bowel sounds, no rebound/guarding; No hepatosplenomegaly  MUSCULOSKELETAL: No edema, cyanosis or deformities.  PSYCH: A+O to person, place, and time; affect appropriate  NEUROLOGY: CN 2-12 are intact and symmetric; no gross neurological deficits.  SKIN: No rashes; no palpable lesions  VASC: Distal pulses palpable    LABS:                        7.9    9.78  )-----------( 404      ( 24 Mar 2022 21:56 )             27.2     03-24    138  |  104  |  16.7  ----------------------------<  127<H>  4.0   |  20.0<L>  |  1.18    Ca    8.9      24 Mar 2022 07:41  Phos  4.0     03-24  Mg     2.0     03-24    TPro  7.2  /  Alb  4.0  /  TBili  <0.2<L>  /  DBili  x   /  AST  10  /  ALT  10  /  AlkPhos  94  03-23    PT/INR - ( 23 Mar 2022 16:55 )   PT: 12.9 sec;   INR: 1.11 ratio         PTT - ( 23 Mar 2022 16:55 )  PTT:32.0 sec          CAPILLARY BLOOD GLUCOSE            RADIOLOGY & ADDITIONAL TESTS:  Results Reviewed:   Imaging Personally Reviewed:  Electrocardiogram Personally Reviewed:

## 2022-03-25 NOTE — PROGRESS NOTE ADULT - PROBLEM SELECTOR PLAN 1
Microcytic anemia, S/p CT of abdomen and pelvis revealed Colocolonic intussusception at the proximal to mid descending colon secondary to a focal colonic mass with adjacent pericolonic lymph nodes with findings suggesting a partial large bowel obstruction.  Due to colocolonic intussusception suggesting partial large bowel obstruction will slowly prep patient over the next 3 days for tentative EGD/colon on Monday.  Continue Clear liquid diet  Dulcolax 10 mg at bedtime.   Obtain CEA  Continue to monitor hemoglobin and transfuse as needed  Continue Iron therapy

## 2022-03-25 NOTE — PROGRESS NOTE ADULT - ASSESSMENT
40 y/o male with profound iron deficiency anemia, uncontrolled HTN, Hx of DM-2, HLD    Acute on chronic BLANCA, Colonic mass w/ signs of intussuception/LBO.  - S/p 3 units pRBC, Hgb improved to 7.9 today  - Monitor CBC, transfuse prn  - GI following  - Continue with PPI.  - CT reviewed - pt moving bowels, with bowel sounds.   - Surgery consult appreciated  - Check CEA  - Dulcolax  - For slow bowel prep for EGD/Colonoscopy on Monday. CLD today.    HTN  - Was previously on 5 agents for BP control, but admits self discontinuing over a year ago  - Will re-start part of prior regimen to avoid precipitous drop in BP  - DASH diet    HLD  - Continue statin    DM-2  - Monitor off medications     DVT ppx: SCDs in setting of anemia    Dispo pending GI workup.   40 y/o male with profound iron deficiency anemia, uncontrolled HTN, Hx of DM-2, HLD    Acute on chronic BLANCA, Colonic mass w/ signs of intussuception/LBO.  - S/p 3 units pRBC, Hgb improved to 7.9 today  - Monitor CBC, transfuse prn  - GI following  - Continue with PPI.  - IV iron  - CT reviewed - pt moving bowels, with bowel sounds.   - Surgery consult appreciated  - Check CEA  - Dulcolax  - For slow bowel prep for EGD/Colonoscopy on Monday. CLD today.    HTN  - Was previously on 5 agents for BP control, but admits self discontinuing over a year ago  - Will re-start part of prior regimen to avoid precipitous drop in BP  - DASH diet    HLD  - Continue statin    DM-2  - Monitor off medications     DVT ppx: SCDs in setting of anemia    Dispo pending GI workup.

## 2022-03-25 NOTE — PROGRESS NOTE ADULT - SUBJECTIVE AND OBJECTIVE BOX
Patient is a 41y old  Male who presents with a chief complaint of Symptomatic anemia (25 Mar 2022 00:54)      INTERVAL HPI/OVERNIGHT EVENTS: Patient seen and evaluated at bedside, at present reporting no complaints, but verbalizes "severe lower abdominal pain overnight that resolved after Tylenol", now s/p CT of abdomen and pelvis revealed Colocolonic intussusception at the proximal to mid descending colon secondary to a focal colonic mass with adjacent pericolonic lymph nodes with findings suggesting a partial large bowel obstruction, delayed abdominal x-rays can be obtained to assess for transit of contrast into the distal colon and rectum. Recommend direct visualization for further evaluation        MEDICATIONS  (STANDING):  amLODIPine   Tablet 10 milliGRAM(s) Oral daily  atorvastatin 20 milliGRAM(s) Oral at bedtime  folic acid 1 milliGRAM(s) Oral daily  labetalol 200 milliGRAM(s) Oral three times a day  multivitamin 1 Tablet(s) Oral daily  pantoprazole  Injectable 40 milliGRAM(s) IV Push every 12 hours  sodium chloride 0.9% lock flush 3 milliLiter(s) IV Push every 8 hours    MEDICATIONS  (PRN):  acetaminophen     Tablet .. 650 milliGRAM(s) Oral every 6 hours PRN Temp greater or equal to 38C (100.4F), Mild Pain (1 - 3)  aluminum hydroxide/magnesium hydroxide/simethicone Suspension 30 milliLiter(s) Oral every 4 hours PRN Dyspepsia  melatonin 3 milliGRAM(s) Oral at bedtime PRN Insomnia  ondansetron Injectable 4 milliGRAM(s) IV Push every 8 hours PRN Nausea and/or Vomiting      Allergies    No Known Allergies    Intolerances        Review of Systems:      Vital Signs Last 24 Hrs  T(C): 36.5 (25 Mar 2022 04:17), Max: 37 (24 Mar 2022 10:01)  T(F): 97.7 (25 Mar 2022 04:17), Max: 98.6 (24 Mar 2022 10:01)  HR: 67 (25 Mar 2022 04:17) (67 - 81)  BP: 125/75 (25 Mar 2022 04:17) (125/75 - 155/82)  BP(mean): --  RR: 18 (25 Mar 2022 04:17) (18 - 18)  SpO2: 99% (25 Mar 2022 04:17) (98% - 100%)    PHYSICAL EXAM:      LABS:                        7.9    9.78  )-----------( 404      ( 24 Mar 2022 21:56 )             27.2     03-24    138  |  104  |  16.7  ----------------------------<  127<H>  4.0   |  20.0<L>  |  1.18    Ca    8.9      24 Mar 2022 07:41  Phos  4.0     03-24  Mg     2.0     03-24    TPro  7.2  /  Alb  4.0  /  TBili  <0.2<L>  /  DBili  x   /  AST  10  /  ALT  10  /  AlkPhos  94  03-23    PT/INR - ( 23 Mar 2022 16:55 )   PT: 12.9 sec;   INR: 1.11 ratio         PTT - ( 23 Mar 2022 16:55 )  PTT:32.0 sec    LIVER FUNCTIONS - ( 23 Mar 2022 16:55 )  Alb: 4.0 g/dL / Pro: 7.2 g/dL / ALK PHOS: 94 U/L / ALT: 10 U/L / AST: 10 U/L / GGT: x             RADIOLOGY & ADDITIONAL TESTS:  < from: CT Abdomen and Pelvis w/ Oral Cont and w/ IV Cont (03.24.22 @ 17:43) >    ACC: [id number] EXAM:  CT ABDOMEN AND PELVIS OC IC                          PROCEDURE DATE:  03/24/2022          INTERPRETATION:  CLINICAL INFORMATION: Severe weight loss, anemia,   abdominal pain    COMPARISON: Renal ultrasound 4/7/2016    CONTRAST/COMPLICATIONS:  IV Contrast: Omnipaque 300  93 cc administered   7 cc discarded  Oral Contrast: Smoothie Readi-Cat 2  Complications: None reported at time of study completion    PROCEDURE:  CT of the Abdomen and Pelvis was performed.  Sagittal and coronal reformats were performed.    FINDINGS:  LOWER CHEST: Within normal limits.    LIVER: Within normal limits.  BILE DUCTS: Normal caliber.  GALLBLADDER: Within normal limits.  SPLEEN: Within normal limits.  PANCREAS: Within normal limits.  ADRENALS:Within normal limits.  KIDNEYS/URETERS: Kidneys enhance bilaterally and symmetrically without   hydronephrosis. Left midpole renal cyst. Additional subcentimeter   hypodensities small to characterize bilaterally. Nonobstructive left   lower lobe poleintrarenal calculus.    BLADDER: Within normal limits.  REPRODUCTIVE ORGANS: Within normal limits.    BOWEL: Short segment colocolonic intussusception in the proximal   descending colon with associated short segment circumferential wall   thickening and adjacent pericolonic lymph nodes concerning for focal   mass. Mildly dilated proximal large bowel with stool suggesting stasis.   Delayed abdominal x-rays can be obtained to assess for transit distal to   the focal thickening to exclude large bowel obstruction. Findings   represent at least a partial large bowel obstruction. Appendix is normal.  PERITONEUM: No ascites.  VESSELS: Atherosclerotic changes.  RETROPERITONEUM/LYMPH NODES: No lymphadenopathy.  ABDOMINAL WALL: Within normal limits.  BONES: Within normal limits.    IMPRESSION:  Colocolonic intussusception at the proximal to mid descending colon   secondary to a focal colonic mass with adjacent pericolonic lymph nodes   with findings suggesting a partial large bowel obstruction, delayed   abdominal x-rays can be obtained to assess for transit of contrast into   the distal colon and rectum. Recommend direct visualization for further   evaluation       Patient is a 41y old  Male who presents with a chief complaint of Symptomatic anemia (25 Mar 2022 00:54)      INTERVAL HPI/OVERNIGHT EVENTS: Patient seen and evaluated at bedside, at present reporting no complaints, but verbalizes "severe lower abdominal pain overnight that resolved after Tylenol", now s/p CT of abdomen and pelvis revealed Colocolonic intussusception at the proximal to mid descending colon secondary to a focal colonic mass with adjacent pericolonic lymph nodes with findings suggesting a partial large bowel obstruction. Denies nausea, vomiting, abdominal pain, chest pain, shortness of breath, hematemesis, hematochezia, melena.    MEDICATIONS  (STANDING):  amLODIPine   Tablet 10 milliGRAM(s) Oral daily  atorvastatin 20 milliGRAM(s) Oral at bedtime  folic acid 1 milliGRAM(s) Oral daily  labetalol 200 milliGRAM(s) Oral three times a day  multivitamin 1 Tablet(s) Oral daily  pantoprazole  Injectable 40 milliGRAM(s) IV Push every 12 hours  sodium chloride 0.9% lock flush 3 milliLiter(s) IV Push every 8 hours    MEDICATIONS  (PRN):  acetaminophen     Tablet .. 650 milliGRAM(s) Oral every 6 hours PRN Temp greater or equal to 38C (100.4F), Mild Pain (1 - 3)  aluminum hydroxide/magnesium hydroxide/simethicone Suspension 30 milliLiter(s) Oral every 4 hours PRN Dyspepsia  melatonin 3 milliGRAM(s) Oral at bedtime PRN Insomnia  ondansetron Injectable 4 milliGRAM(s) IV Push every 8 hours PRN Nausea and/or Vomiting      Allergies    No Known Allergies    Intolerances    Review of Systems:  · ENMT: negative  · Respiratory and Thorax: negative  · Cardiovascular: negative  · Gastrointestinal: see above.  · Genitourinary:	negative  · Musculoskeletal: negative  · Neurological: negative  · Psychiatric: negative  · Hematology/Lymphatics: negative  · Endocrine: negative      Vital Signs Last 24 Hrs  T(C): 36.5 (25 Mar 2022 04:17), Max: 37 (24 Mar 2022 10:01)  T(F): 97.7 (25 Mar 2022 04:17), Max: 98.6 (24 Mar 2022 10:01)  HR: 67 (25 Mar 2022 04:17) (67 - 81)  BP: 125/75 (25 Mar 2022 04:17) (125/75 - 155/82)  BP(mean): --  RR: 18 (25 Mar 2022 04:17) (18 - 18)  SpO2: 99% (25 Mar 2022 04:17) (98% - 100%)    PHYSICAL EXAM:  · Constitutional:  man, afebrile, sitting comfortably, in no acute distress.   · Eyes: EOMI; PERRL; no drainage or redness  · ENMT: No oral lesions; no gross abnormalities  · Neck:	No bruits; no thyromegaly or nodules  · Back:	No deformity or limitation of movement  · Respiratory: Breath Sounds equal & clear to percussion & auscultation, no accessory muscle use  · Cardiovascular: Regular rate & rhythm, normal S1, S2; no murmurs, gallops or rubs; no S3, S4  · Gastrointestinal: Soft, non-tender, no hepatosplenomegaly, normal bowel sounds      LABS:                        7.9    9.78  )-----------( 404      ( 24 Mar 2022 21:56 )             27.2     03-24    138  |  104  |  16.7  ----------------------------<  127<H>  4.0   |  20.0<L>  |  1.18    Ca    8.9      24 Mar 2022 07:41  Phos  4.0     03-24  Mg     2.0     03-24    TPro  7.2  /  Alb  4.0  /  TBili  <0.2<L>  /  DBili  x   /  AST  10  /  ALT  10  /  AlkPhos  94  03-23    PT/INR - ( 23 Mar 2022 16:55 )   PT: 12.9 sec;   INR: 1.11 ratio         PTT - ( 23 Mar 2022 16:55 )  PTT:32.0 sec    LIVER FUNCTIONS - ( 23 Mar 2022 16:55 )  Alb: 4.0 g/dL / Pro: 7.2 g/dL / ALK PHOS: 94 U/L / ALT: 10 U/L / AST: 10 U/L / GGT: x             RADIOLOGY & ADDITIONAL TESTS:  < from: CT Abdomen and Pelvis w/ Oral Cont and w/ IV Cont (03.24.22 @ 17:43) >    ACC: [id number] EXAM:  CT ABDOMEN AND PELVIS OC IC                          PROCEDURE DATE:  03/24/2022          INTERPRETATION:  CLINICAL INFORMATION: Severe weight loss, anemia,   abdominal pain    COMPARISON: Renal ultrasound 4/7/2016    CONTRAST/COMPLICATIONS:  IV Contrast: Omnipaque 300  93 cc administered   7 cc discarded  Oral Contrast: Smoothie Readi-Cat 2  Complications: None reported at time of study completion    PROCEDURE:  CT of the Abdomen and Pelvis was performed.  Sagittal and coronal reformats were performed.    FINDINGS:  LOWER CHEST: Within normal limits.    LIVER: Within normal limits.  BILE DUCTS: Normal caliber.  GALLBLADDER: Within normal limits.  SPLEEN: Within normal limits.  PANCREAS: Within normal limits.  ADRENALS:Within normal limits.  KIDNEYS/URETERS: Kidneys enhance bilaterally and symmetrically without   hydronephrosis. Left midpole renal cyst. Additional subcentimeter   hypodensities small to characterize bilaterally. Nonobstructive left   lower lobe poleintrarenal calculus.    BLADDER: Within normal limits.  REPRODUCTIVE ORGANS: Within normal limits.    BOWEL: Short segment colocolonic intussusception in the proximal   descending colon with associated short segment circumferential wall   thickening and adjacent pericolonic lymph nodes concerning for focal   mass. Mildly dilated proximal large bowel with stool suggesting stasis.   Delayed abdominal x-rays can be obtained to assess for transit distal to   the focal thickening to exclude large bowel obstruction. Findings   represent at least a partial large bowel obstruction. Appendix is normal.  PERITONEUM: No ascites.  VESSELS: Atherosclerotic changes.  RETROPERITONEUM/LYMPH NODES: No lymphadenopathy.  ABDOMINAL WALL: Within normal limits.  BONES: Within normal limits.    IMPRESSION:  Colocolonic intussusception at the proximal to mid descending colon   secondary to a focal colonic mass with adjacent pericolonic lymph nodes   with findings suggesting a partial large bowel obstruction, delayed   abdominal x-rays can be obtained to assess for transit of contrast into   the distal colon and rectum. Recommend direct visualization for further   evaluation

## 2022-03-25 NOTE — CONSULT NOTE ADULT - SUBJECTIVE AND OBJECTIVE BOX
COLORECTAL SURGERY CONSULT  ===========================================================    Colorectal surgery consulted for colonic mass found on CT with associated colocolonic intussusception and concern for partial LBO. HPI as below. Patient seen and examined at bedside, found resting comfortably. Patient denies any current abdominal pain. Denies dark or bloody stools. Reports he has had about 20 pound weight loss over 6 months as well as occasional chills. Denies nausea, vomiting, fever, chest pain, or SOB. Never had a colonoscopy. Last BM was last night, continues to pass flatus     HPI:  40 y/o male with history of HTN/DM-2, sent in by PMD after o/p blood work showed severe anemia with a Hbg of 5.3. Patient was c/o feeling weak and dizzy at times when standing up. Denies any history of anemia or family history of blood disorders. Denies any BRBPR or dark stools, no vomiting blood or hematuria. He is not using any NSAIDs. Denies ever having an EGD or Colonoscopy. In the ED Hbg confirmed at 5.3, vitals stable with exception of elevated BP, but patient admits to being off all his medications for the past year. His indicis and iron studies c/w profound iron def anemia. Stool brown and occult neg. Denies CP, Fever, chills, N/V, abdominal pain.  (23 Mar 2022 22:49)      PAST MEDICAL & SURGICAL HISTORY:  HTN (hypertension)    DM (diabetes mellitus)    HLD (hyperlipidemia)    No significant past surgical history      Home Meds: Home Medications:    Allergies: Allergies    No Known Allergies    Intolerances      Soc:   Advanced Directives: Presumed Full Code     CURRENT MEDICATIONS:   --------------------------------------------------------------------------------------  Neurologic Medications  acetaminophen     Tablet .. 650 milliGRAM(s) Oral every 6 hours PRN Temp greater or equal to 38C (100.4F), Mild Pain (1 - 3)  melatonin 3 milliGRAM(s) Oral at bedtime PRN Insomnia  ondansetron Injectable 4 milliGRAM(s) IV Push every 8 hours PRN Nausea and/or Vomiting    Respiratory Medications    Cardiovascular Medications  amLODIPine   Tablet 10 milliGRAM(s) Oral daily  labetalol 200 milliGRAM(s) Oral three times a day    Gastrointestinal Medications  aluminum hydroxide/magnesium hydroxide/simethicone Suspension 30 milliLiter(s) Oral every 4 hours PRN Dyspepsia  folic acid 1 milliGRAM(s) Oral daily  multivitamin 1 Tablet(s) Oral daily  pantoprazole  Injectable 40 milliGRAM(s) IV Push every 12 hours  sodium chloride 0.9% lock flush 3 milliLiter(s) IV Push every 8 hours    Genitourinary Medications    Hematologic/Oncologic Medications    Antimicrobial/Immunologic Medications    Endocrine/Metabolic Medications  atorvastatin 20 milliGRAM(s) Oral at bedtime    Topical/Other Medications    --------------------------------------------------------------------------------------    VITAL SIGNS, INS/OUTS (last 24 hours):  --------------------------------------------------------------------------------------  ICU Vital Signs Last 24 Hrs  T(C): 36.8 (24 Mar 2022 20:09), Max: 37 (24 Mar 2022 10:01)  T(F): 98.3 (24 Mar 2022 20:09), Max: 98.6 (24 Mar 2022 10:01)  HR: 81 (24 Mar 2022 20:09) (69 - 81)  BP: 155/82 (24 Mar 2022 20:09) (130/76 - 155/82)  BP(mean): --  ABP: --  ABP(mean): --  RR: 18 (24 Mar 2022 20:09) (18 - 18)  SpO2: 98% (24 Mar 2022 20:09) (98% - 100%)    I&O's Summary    --------------------------------------------------------------------------------------    EXAM:  General/Neuro  GCS: 15  Exam: Normal, NAD, alert, oriented x 3, no focal deficits.     Respiratory  Exam: Unlabored, no conversational dyspnea    Cardiovascular  Exam: S1, S2.  Regular rate and rhythm.      GI  Exam: Abdomen soft, Non-tender, Non-distended. No rebound or guarding.     Extremities  Exam: Extremities warm, pink, well-perfused.      Derm:  Exam: Good skin turgor, no skin breakdown.          LABS  --------------------------------------------------------------------------------------  Labs:  CAPILLARY BLOOD GLUCOSE                              7.9    9.78  )-----------( 404      ( 24 Mar 2022 21:56 )             27.2         03-24    138  |  104  |  16.7  ----------------------------<  127<H>  4.0   |  20.0<L>  |  1.18      Calcium, Total Serum: 8.9 mg/dL (03-24-22 @ 07:41)      LFTs:             7.2  | <0.2 | 10       ------------------[94      ( 23 Mar 2022 16:55 )  4.0  | x    | 10          Lipase:x      Amylase:x             Coags:     12.9   ----< 1.11    ( 23 Mar 2022 16:55 )     32.0        --------------------------------------------------------------------------------------      IMAGING RESULTS  CT A/P  IMPRESSION:  Colocolonic intussusception at the proximal to mid descending colon secondary to a focal colonic mass with adjacent pericolonic lymph nodes with findings suggesting a partial large bowel obstruction, delayed abdominal x-rays can be obtained to assess for transit of contrast into the distal colon and rectum. Recommend direct visualization for further evaluation      QING CONTRERAS MD; Attending Radiologist  This document has been electronically signed. Mar 24 2022 8:00PM

## 2022-03-25 NOTE — CONSULT NOTE ADULT - ATTENDING COMMENTS
Patient was seen and examined this morning. History, labs, exam and imaging findings reviewed. He was sent to hospital by his PCP for anemia and also has had intermittent abdominal pain. On CT, there seems to be a mass in the proximal descending colon and radiology read is for intussusception. With this finding combined with anemia, concern is for malignancy. He is not having any abdominal pain and exam in benign. No symptoms of obstruction either as he continues to have bowel function. There is no radiologic evidence of obstruction- there is stool and gas distal to the area in descending colon. Will await GI colonoscopy which is tentatively scheduled for Monday. Will get CEA in the interim but await final diagnosis prior to pursuing any further radiologic work up. CRS will see patient next week once colonoscopy is performed.

## 2022-03-25 NOTE — CONSULT NOTE ADULT - ASSESSMENT
41yoM with HTN and DM who presents with profound anemia, work-up revealing colonic mass with concern for partial LBO. Patient has no pain currently, having bowel movements, and passing gas. There is no urgent indication for intervention at this time. Plan per GI for colonoscopy tomorrow.     #Colonic Mass  - Discussion should be had between primary team and patient regarding results of CT scan.  - Will f/u colonoscopy findings  - Care per primary    Plan discussed with Dr. Perla who agrees.

## 2022-03-26 LAB
ALBUMIN SERPL ELPH-MCNC: 3.6 G/DL — SIGNIFICANT CHANGE UP (ref 3.3–5.2)
ALP SERPL-CCNC: 93 U/L — SIGNIFICANT CHANGE UP (ref 40–120)
ALT FLD-CCNC: 8 U/L — SIGNIFICANT CHANGE UP
ANION GAP SERPL CALC-SCNC: 14 MMOL/L — SIGNIFICANT CHANGE UP (ref 5–17)
AST SERPL-CCNC: 9 U/L — SIGNIFICANT CHANGE UP
BILIRUB SERPL-MCNC: 0.3 MG/DL — LOW (ref 0.4–2)
BUN SERPL-MCNC: 15.1 MG/DL — SIGNIFICANT CHANGE UP (ref 8–20)
CALCIUM SERPL-MCNC: 9.1 MG/DL — SIGNIFICANT CHANGE UP (ref 8.6–10.2)
CEA SERPL-MCNC: 4.2 NG/ML — HIGH (ref 0–3.8)
CHLORIDE SERPL-SCNC: 107 MMOL/L — SIGNIFICANT CHANGE UP (ref 98–107)
CO2 SERPL-SCNC: 20 MMOL/L — LOW (ref 22–29)
CREAT SERPL-MCNC: 1.47 MG/DL — HIGH (ref 0.5–1.3)
EGFR: 60 ML/MIN/1.73M2 — SIGNIFICANT CHANGE UP
GLUCOSE SERPL-MCNC: 98 MG/DL — SIGNIFICANT CHANGE UP (ref 70–99)
HCT VFR BLD CALC: 30.1 % — LOW (ref 39–50)
HGB BLD-MCNC: 8.5 G/DL — LOW (ref 13–17)
MCHC RBC-ENTMCNC: 18.8 PG — LOW (ref 27–34)
MCHC RBC-ENTMCNC: 28.2 GM/DL — LOW (ref 32–36)
MCV RBC AUTO: 66.4 FL — LOW (ref 80–100)
PLATELET # BLD AUTO: 383 K/UL — SIGNIFICANT CHANGE UP (ref 150–400)
POTASSIUM SERPL-MCNC: 4.4 MMOL/L — SIGNIFICANT CHANGE UP (ref 3.5–5.3)
POTASSIUM SERPL-SCNC: 4.4 MMOL/L — SIGNIFICANT CHANGE UP (ref 3.5–5.3)
PROT SERPL-MCNC: 6.7 G/DL — SIGNIFICANT CHANGE UP (ref 6.6–8.7)
RBC # BLD: 4.53 M/UL — SIGNIFICANT CHANGE UP (ref 4.2–5.8)
RBC # FLD: 26.3 % — HIGH (ref 10.3–14.5)
SODIUM SERPL-SCNC: 141 MMOL/L — SIGNIFICANT CHANGE UP (ref 135–145)
WBC # BLD: 7.47 K/UL — SIGNIFICANT CHANGE UP (ref 3.8–10.5)
WBC # FLD AUTO: 7.47 K/UL — SIGNIFICANT CHANGE UP (ref 3.8–10.5)

## 2022-03-26 PROCEDURE — 99233 SBSQ HOSP IP/OBS HIGH 50: CPT

## 2022-03-26 RX ADMIN — ATORVASTATIN CALCIUM 20 MILLIGRAM(S): 80 TABLET, FILM COATED ORAL at 21:23

## 2022-03-26 RX ADMIN — Medication 1 TABLET(S): at 11:49

## 2022-03-26 RX ADMIN — SODIUM CHLORIDE 3 MILLILITER(S): 9 INJECTION INTRAMUSCULAR; INTRAVENOUS; SUBCUTANEOUS at 05:40

## 2022-03-26 RX ADMIN — PANTOPRAZOLE SODIUM 40 MILLIGRAM(S): 20 TABLET, DELAYED RELEASE ORAL at 05:38

## 2022-03-26 RX ADMIN — Medication 200 MILLIGRAM(S): at 21:24

## 2022-03-26 RX ADMIN — SODIUM CHLORIDE 3 MILLILITER(S): 9 INJECTION INTRAMUSCULAR; INTRAVENOUS; SUBCUTANEOUS at 21:24

## 2022-03-26 RX ADMIN — Medication 1 BOTTLE: at 11:49

## 2022-03-26 RX ADMIN — Medication 200 MILLIGRAM(S): at 11:50

## 2022-03-26 RX ADMIN — Medication 10 MILLIGRAM(S): at 21:23

## 2022-03-26 RX ADMIN — IRON SUCROSE 110 MILLIGRAM(S): 20 INJECTION, SOLUTION INTRAVENOUS at 05:38

## 2022-03-26 RX ADMIN — Medication 200 MILLIGRAM(S): at 05:39

## 2022-03-26 RX ADMIN — AMLODIPINE BESYLATE 10 MILLIGRAM(S): 2.5 TABLET ORAL at 05:39

## 2022-03-26 RX ADMIN — SODIUM CHLORIDE 3 MILLILITER(S): 9 INJECTION INTRAMUSCULAR; INTRAVENOUS; SUBCUTANEOUS at 12:01

## 2022-03-26 RX ADMIN — Medication 1 MILLIGRAM(S): at 11:50

## 2022-03-26 RX ADMIN — PANTOPRAZOLE SODIUM 40 MILLIGRAM(S): 20 TABLET, DELAYED RELEASE ORAL at 17:34

## 2022-03-26 NOTE — PROGRESS NOTE ADULT - PROBLEM SELECTOR PLAN 1
Iron deficiency anemia likely 2/2 focal colonic mass noted on CT with colocolonic intussusception at the proximal to mid descending colon with adjacent pericolonic lymph nodes with findings suggesting a partial large bowel obstruction   Patient had a liquid brown BM this morning  Continue Clear liquid diet, Golytely tomorrow  Continue with slow bowel prep over the next 2 days for tentative colonoscopy on Monday.  CEA pending  Continue to monitor hemoglobin and transfuse as needed  Continue Iron therapy

## 2022-03-26 NOTE — PROGRESS NOTE ADULT - SUBJECTIVE AND OBJECTIVE BOX
Patient is a 41y old  Male who presents with a chief complaint of Symptomatic anemia (26 Mar 2022 10:38)      HPI:  40 y/o male patient seen and evaluated at bedside, at present reporting no complaints. CT of abdomen and pelvis revealed Colocolonic intussusception at the proximal to mid descending colon secondary to a focal colonic mass with adjacent pericolonic lymph nodes with findings suggesting a partial large bowel obstruction. Patient had a liquid brown BM this morning. He is on clear liquid diet and tolerating well.        REVIEW OF SYSTEMS:  Constitutional: No fever  ENMT:  No difficulty hearing, tinnitus, vertigo; No sinus or throat pain  Respiratory: No cough, wheezing, chills or hemoptysis  Cardiovascular: No chest pain, palpitations, dizziness or leg swelling  Gastrointestinal: No abdominal or epigastric pain. No nausea, vomiting or hematemesis; No constipation. No melena or hematochezia.  Skin: No itching, burning, rashes or lesions   Musculoskeletal: No joint pain or swelling; No muscle, back or extremity pain    PAST MEDICAL & SURGICAL HISTORY:  HTN (hypertension)    DM (diabetes mellitus)    HLD (hyperlipidemia)    No significant past surgical history        FAMILY HISTORY:  No pertinent family history in first degree relatives        SOCIAL HISTORY:  Smoking Status: [ ] Current, [ ] Former, [ ] Never  Pack Years:  [  ] EtOH  [  ] IVDA    MEDICATIONS:  MEDICATIONS  (STANDING):  amLODIPine   Tablet 10 milliGRAM(s) Oral daily  atorvastatin 20 milliGRAM(s) Oral at bedtime  bisacodyl 10 milliGRAM(s) Oral at bedtime  folic acid 1 milliGRAM(s) Oral daily  labetalol 200 milliGRAM(s) Oral three times a day  multivitamin 1 Tablet(s) Oral daily  pantoprazole  Injectable 40 milliGRAM(s) IV Push every 12 hours  sodium chloride 0.9% lock flush 3 milliLiter(s) IV Push every 8 hours    MEDICATIONS  (PRN):  acetaminophen     Tablet .. 650 milliGRAM(s) Oral every 6 hours PRN Temp greater or equal to 38C (100.4F), Mild Pain (1 - 3)  aluminum hydroxide/magnesium hydroxide/simethicone Suspension 30 milliLiter(s) Oral every 4 hours PRN Dyspepsia  melatonin 3 milliGRAM(s) Oral at bedtime PRN Insomnia  ondansetron Injectable 4 milliGRAM(s) IV Push every 8 hours PRN Nausea and/or Vomiting      Allergies    No Known Allergies    Intolerances        Vital Signs Last 24 Hrs  T(C): 36.9 (26 Mar 2022 11:10), Max: 36.9 (25 Mar 2022 17:05)  T(F): 98.5 (26 Mar 2022 11:10), Max: 98.5 (25 Mar 2022 17:05)  HR: 68 (26 Mar 2022 11:10) (61 - 69)  BP: 132/77 (26 Mar 2022 11:10) (19/76 - 143/85)  BP(mean): --  RR: 18 (26 Mar 2022 11:10) (18 - 18)  SpO2: 99% (26 Mar 2022 11:10) (98% - 100%)        PHYSICAL EXAM:    General:  male, sitting comfortably, in no acute distress  HEENT: MMM, conjunctiva and sclera clear  Gastrointestinal: Soft, non-tender non-distended; Normal bowel sounds; No rebound or guarding  Extremities: Normal range of motion, No clubbing, cyanosis or edema  Neurological: Alert and oriented x3  Skin: Warm and dry. No obvious rash      LABS:                        8.5    7.47  )-----------( 383      ( 26 Mar 2022 08:44 )             30.1     26 Mar 2022 08:44    141    |  107    |  15.1   ----------------------------<  98     4.4     |  20.0   |  1.47     Ca    9.1        26 Mar 2022 08:44    TPro  6.7    /  Alb  3.6    /  TBili  0.3    /  DBili  x      /  AST  9      /  ALT  8      /  AlkPhos  93     / Amylase x      /Lipase x      26 Mar 2022 08:44              RADIOLOGY & ADDITIONAL STUDIES:       < from: CT Abdomen and Pelvis w/ Oral Cont and w/ IV Cont (03.24.22 @ 17:43) >    ACC: [id number] EXAM:  CT ABDOMEN AND PELVIS OC IC                          PROCEDURE DATE:  03/24/2022          INTERPRETATION:  CLINICAL INFORMATION: Severe weight loss, anemia,   abdominal pain    COMPARISON: Renal ultrasound 4/7/2016    CONTRAST/COMPLICATIONS:  IV Contrast: Omnipaque 300  93 cc administered   7 cc discarded  Oral Contrast: Smoothie Readi-Cat 2  Complications: None reported at time of study completion    PROCEDURE:  CT of the Abdomen and Pelvis was performed.  Sagittal and coronal reformats were performed.    FINDINGS:  LOWER CHEST: Within normal limits.    LIVER: Within normal limits.  BILE DUCTS: Normal caliber.  GALLBLADDER: Within normal limits.  SPLEEN: Within normal limits.  PANCREAS: Within normal limits.  ADRENALS:Within normal limits.  KIDNEYS/URETERS: Kidneys enhance bilaterally and symmetrically without   hydronephrosis. Left midpole renal cyst. Additional subcentimeter   hypodensities small to characterize bilaterally. Nonobstructive left   lower lobe poleintrarenal calculus.    BLADDER: Within normal limits.  REPRODUCTIVE ORGANS: Within normal limits.    BOWEL: Short segment colocolonic intussusception in the proximal   descending colon with associated short segment circumferential wall   thickening and adjacent pericolonic lymph nodes concerning for focal   mass. Mildly dilated proximal large bowel with stool suggesting stasis.   Delayed abdominal x-rays can be obtained to assess for transit distal to   the focal thickening to exclude large bowel obstruction. Findings   represent at least a partial large bowel obstruction. Appendix is normal.  PERITONEUM: No ascites.  VESSELS: Atherosclerotic changes.  RETROPERITONEUM/LYMPH NODES: No lymphadenopathy.  ABDOMINAL WALL: Within normal limits.  BONES: Within normal limits.    IMPRESSION:  Colocolonic intussusception at the proximal to mid descending colon   secondary to a focal colonic mass with adjacent pericolonic lymph nodes   with findings suggesting a partial large bowel obstruction, delayed   abdominal x-rays can be obtained to assess for transit of contrast into   the distal colon and rectum. Recommend direct visualization for further   evaluation        --- End of Report ---            QING CONTRERAS MD; Attending Radiologist  This document has been electronically signed. Mar 24 2022  8:00PM    < end of copied text >   bed rails

## 2022-03-26 NOTE — PROGRESS NOTE ADULT - SUBJECTIVE AND OBJECTIVE BOX
Colorectal surgery consulted for colonic mass found on CT with associated colocolonic intussusception and concern for partial LBO.    41M Sent in by PMD after o/p blood work showed severe anemia with a Hbg of 5.3. Patient was c/o feeling weak and dizzy at times when standing up. Denies any history of anemia or family history of blood disorders. Denies any BRBPR or dark stools, no vomiting blood or hematuria. He is not using any NSAIDs. Denies ever having an EGD or Colonoscopy. In the ED Hbg confirmed at 5.3, vitals stable with exception of elevated BP, but patient admits to being off all his medications for the past year. His indicis and iron studies c/w profound iron def anemia. Stool brown and occult neg. Denies CP, Fever, chills, N/V, abdominal pain.  (23 Mar 2022 22:49)    Patient , found resting comfortably. Patient denies any current abdominal pain. Denies dark or bloody stools. Reports he has had about 20 pound weight loss over 6 months as well as occasional chills. Denies nausea, vomiting, fever, chest pain, or SOB. Never had a colonoscopy. Last BM was last night, continues to pass flatus.    Assessment;    41yoM with HTN and DM who presents with profound anemia, work-up revealing colonic mass with concern for partial LBO. Patient has no pain currently, having bowel movements, and passing gas. There is no urgent indication for intervention at this time. Plan per GI for colonoscopy tomorrow.     #Colonic Mass  -CEA 4.9  - Discussion should be had between primary team and patient regarding results of CT scan.  - Will f/u colonoscopy findings---Pending colonoscopy  - Care per primary

## 2022-03-26 NOTE — PROGRESS NOTE ADULT - ASSESSMENT
40 y/o male with profound iron deficiency anemia, uncontrolled HTN, Hx of DM-2, HLD    #Acute on chronic BLANCA  - in patient with colonic mass with ?intussuception/ lbo  - surgery consult appreciated  - cea elevated  - monitor h and h  - s/p prbc  - GI Consult appreciated- egd and colonoscopy 3/28  - IV iron    #HTN  - monitor blood pressure  - amlodipine and labetalol    #HLD  - statin    #DVT Prophylaxis  - venodynes   40 y/o male with profound iron deficiency anemia, uncontrolled HTN, Hx of DM-2, HLD    #Acute on chronic BLANCA  - in patient with colonic mass with ?intussuception/ lbo  - surgery consult appreciated  - cea elevated  - monitor h and h  - s/p prbc  - GI Consult appreciated- egd and colonoscopy 3/28  - IV iron    #HTN  - monitor blood pressure  - amlodipine and labetalol    #HLD  - statin    #DVT Prophylaxis  - venodynes    spoke to patient and wife bedside. all questions answered. advised with mass seen on ct scan and egd/colonoscopy pending monday

## 2022-03-26 NOTE — PROGRESS NOTE ADULT - SUBJECTIVE AND OBJECTIVE BOX
Patient is a 41y old  Male who presents with a chief complaint of Symptomatic anemia (26 Mar 2022 04:27)    Patient seen and examined at bedside.     ALLERGIES:  No Known Allergies    MEDICATIONS  (STANDING):  amLODIPine   Tablet 10 milliGRAM(s) Oral daily  atorvastatin 20 milliGRAM(s) Oral at bedtime  bisacodyl 10 milliGRAM(s) Oral at bedtime  folic acid 1 milliGRAM(s) Oral daily  labetalol 200 milliGRAM(s) Oral three times a day  magnesium citrate Oral Solution 1 Bottle Oral once  multivitamin 1 Tablet(s) Oral daily  pantoprazole  Injectable 40 milliGRAM(s) IV Push every 12 hours  sodium chloride 0.9% lock flush 3 milliLiter(s) IV Push every 8 hours    MEDICATIONS  (PRN):  acetaminophen     Tablet .. 650 milliGRAM(s) Oral every 6 hours PRN Temp greater or equal to 38C (100.4F), Mild Pain (1 - 3)  aluminum hydroxide/magnesium hydroxide/simethicone Suspension 30 milliLiter(s) Oral every 4 hours PRN Dyspepsia  melatonin 3 milliGRAM(s) Oral at bedtime PRN Insomnia  ondansetron Injectable 4 milliGRAM(s) IV Push every 8 hours PRN Nausea and/or Vomiting    Vital Signs Last 24 Hrs  T(F): 98.1 (26 Mar 2022 04:54), Max: 98.5 (25 Mar 2022 17:05)  HR: 69 (26 Mar 2022 04:54) (61 - 69)  BP: 132/79 (26 Mar 2022 04:54) (19/76 - 143/85)  RR: 18 (26 Mar 2022 04:54) (18 - 18)  SpO2: 98% (26 Mar 2022 04:54) (98% - 100%)  I&O's Summary    PHYSICAL EXAM:  General: NAD, alert  ENT: MMM, no thrush  Neck: Supple, No JVD  Lungs: Clear to auscultation bilaterally, good air entry, non-labored breathing  Cardio: +s1/s2  Abdomen: Soft, Nontender, Nondistended; Bowel sounds present  Extremities: No calf tenderness, No pitting edema    LABS:                        8.5    7.47  )-----------( 383      ( 26 Mar 2022 08:44 )             30.1     03-26    141  |  107  |  15.1  ----------------------------<  98  4.4   |  20.0  |  1.47    Ca    9.1      26 Mar 2022 08:44  Phos  4.0     03-24  Mg     2.0     03-24    TPro  6.7  /  Alb  3.6  /  TBili  0.3  /  DBili  x   /  AST  9   /  ALT  8   /  AlkPhos  93  03-26    PT/INR - ( 23 Mar 2022 16:55 )   PT: 12.9 sec;   INR: 1.11 ratio    PTT - ( 23 Mar 2022 16:55 )  PTT:32.0 sec    RADIOLOGY & ADDITIONAL TESTS:  - no new tests

## 2022-03-27 ENCOUNTER — TRANSCRIPTION ENCOUNTER (OUTPATIENT)
Age: 42
End: 2022-03-27

## 2022-03-27 LAB — SARS-COV-2 RNA SPEC QL NAA+PROBE: SIGNIFICANT CHANGE UP

## 2022-03-27 PROCEDURE — 99233 SBSQ HOSP IP/OBS HIGH 50: CPT

## 2022-03-27 RX ORDER — SODIUM CHLORIDE 9 MG/ML
1000 INJECTION, SOLUTION INTRAVENOUS
Refills: 0 | Status: DISCONTINUED | OUTPATIENT
Start: 2022-03-27 | End: 2022-03-27

## 2022-03-27 RX ADMIN — SODIUM CHLORIDE 3 MILLILITER(S): 9 INJECTION INTRAMUSCULAR; INTRAVENOUS; SUBCUTANEOUS at 21:15

## 2022-03-27 RX ADMIN — SODIUM CHLORIDE 3 MILLILITER(S): 9 INJECTION INTRAMUSCULAR; INTRAVENOUS; SUBCUTANEOUS at 12:02

## 2022-03-27 RX ADMIN — ATORVASTATIN CALCIUM 20 MILLIGRAM(S): 80 TABLET, FILM COATED ORAL at 21:15

## 2022-03-27 RX ADMIN — Medication 200 MILLIGRAM(S): at 12:03

## 2022-03-27 RX ADMIN — Medication 1 TABLET(S): at 12:01

## 2022-03-27 RX ADMIN — AMLODIPINE BESYLATE 10 MILLIGRAM(S): 2.5 TABLET ORAL at 05:39

## 2022-03-27 RX ADMIN — Medication 200 MILLIGRAM(S): at 21:14

## 2022-03-27 RX ADMIN — PANTOPRAZOLE SODIUM 40 MILLIGRAM(S): 20 TABLET, DELAYED RELEASE ORAL at 17:55

## 2022-03-27 RX ADMIN — Medication 1 MILLIGRAM(S): at 12:02

## 2022-03-27 RX ADMIN — Medication 4000 MILLILITER(S): at 18:13

## 2022-03-27 RX ADMIN — PANTOPRAZOLE SODIUM 40 MILLIGRAM(S): 20 TABLET, DELAYED RELEASE ORAL at 05:39

## 2022-03-27 RX ADMIN — SODIUM CHLORIDE 3 MILLILITER(S): 9 INJECTION INTRAMUSCULAR; INTRAVENOUS; SUBCUTANEOUS at 05:39

## 2022-03-27 RX ADMIN — Medication 200 MILLIGRAM(S): at 05:39

## 2022-03-27 NOTE — PROGRESS NOTE ADULT - SUBJECTIVE AND OBJECTIVE BOX
Patient is a 41y old  Male who presents with a chief complaint of Symptomatic anemia (26 Mar 2022 11:53)      HPI:  40 y/o male with history of HTN/DM-2, sent in by PMD after o/p blood work showed severe anemia with a Hbg of 5.3.  CT showed sigmoid mass. NO abdominal pain. Had diarrhea yesterday secondary to mag citrate.   NO pain, no nausea, tolerating clear liquids     REVIEW OF SYSTEMS:  Constitutional: No fever, weight loss or fatigue  ENMT:  No difficulty hearing, tinnitus, vertigo; No sinus or throat pain  Respiratory: No cough, wheezing, chills or hemoptysis  Cardiovascular: No chest pain, palpitations, dizziness or leg swelling  Gastrointestinal: No abdominal or epigastric pain. No nausea, vomiting or hematemesis; No diarrhea or constipation. No melena or hematochezia.  Skin: No itching, burning, rashes or lesions   Musculoskeletal: No joint pain or swelling; No muscle, back or extremity pain    PAST MEDICAL & SURGICAL HISTORY:  HTN (hypertension)    DM (diabetes mellitus)    HLD (hyperlipidemia)    No significant past surgical history        FAMILY HISTORY:  No pertinent family history in first degree relatives        SOCIAL HISTORY:  Smoking Status: [ ] Current, [ ] Former, [ ] Never  Pack Years:  [  ] EtOH-no  [  ] IVDA    MEDICATIONS:  MEDICATIONS  (STANDING):  amLODIPine   Tablet 10 milliGRAM(s) Oral daily  atorvastatin 20 milliGRAM(s) Oral at bedtime  bisacodyl 10 milliGRAM(s) Oral at bedtime  folic acid 1 milliGRAM(s) Oral daily  labetalol 200 milliGRAM(s) Oral three times a day  multivitamin 1 Tablet(s) Oral daily  pantoprazole  Injectable 40 milliGRAM(s) IV Push every 12 hours  polyethylene glycol/electrolyte Solution. 4000 milliLiter(s) Oral once  sodium chloride 0.9% lock flush 3 milliLiter(s) IV Push every 8 hours    MEDICATIONS  (PRN):  acetaminophen     Tablet .. 650 milliGRAM(s) Oral every 6 hours PRN Temp greater or equal to 38C (100.4F), Mild Pain (1 - 3)  aluminum hydroxide/magnesium hydroxide/simethicone Suspension 30 milliLiter(s) Oral every 4 hours PRN Dyspepsia  melatonin 3 milliGRAM(s) Oral at bedtime PRN Insomnia  ondansetron Injectable 4 milliGRAM(s) IV Push every 8 hours PRN Nausea and/or Vomiting      Allergies    No Known Allergies    Intolerances        Vital Signs Last 24 Hrs  T(C): 37.1 (27 Mar 2022 05:09), Max: 37.2 (26 Mar 2022 20:25)  T(F): 98.7 (27 Mar 2022 05:09), Max: 98.9 (26 Mar 2022 20:25)  HR: 74 (27 Mar 2022 05:09) (63 - 74)  BP: 135/71 (27 Mar 2022 05:09) (131/80 - 135/71)  BP(mean): --  RR: 18 (27 Mar 2022 05:09) (18 - 18)  SpO2: 99% (27 Mar 2022 05:09) (98% - 99%)        PHYSICAL EXAM:    General: ; in no acute distress, thin   HEENT: MMM, conjunctiva and sclera clear  H-RRR  L-CTA  Gastrointestinal: Soft, non-tender non-distended; Normal bowel sounds; No rebound or guarding  Extremities: Normal range of motion, No clubbing, cyanosis or edema  Neurological: Alert and oriented x3  Skin: Warm and dry. No obvious rash      LABS:                        8.5    7.47  )-----------( 383      ( 26 Mar 2022 08:44 )             30.1     26 Mar 2022 08:44    141    |  107    |  15.1   ----------------------------<  98     4.4     |  20.0   |  1.47     Ca    9.1        26 Mar 2022 08:44    TPro  6.7    /  Alb  3.6    /  TBili  0.3    /  DBili  x      /  AST  9      /  ALT  8      /  AlkPhos  93     / Amylase x      /Lipase x      26 Mar 2022 08:44              RADIOLOGY & ADDITIONAL STUDIES:     < from: CT Abdomen and Pelvis w/ Oral Cont and w/ IV Cont (03.24.22 @ 17:43) >  RESSION:  Colocolonic intussusception at the proximal to mid descending colon   secondary to a focal colonic mass with adjacent pericolonic lymph nodes   with findings suggesting a partial large bowel obstruction, delayed   abdominal x-rays can be obtained to assess for transit of contrast into   the distal colon and rectum. Recommend direct visualization for further   evaluation    < end of copied text >

## 2022-03-27 NOTE — PROGRESS NOTE ADULT - ASSESSMENT
42 y/o male with profound iron deficiency anemia, uncontrolled HTN, Hx of DM-2, HLD    #Acute blood loss anemia likely from LGI bleed; acute on chronic BLANCA and symptomatic anemia  - in patient with colonic mass with ?intussuception/ lbo  - surgery consult appreciated - still with dark stool  - CEA elevated  - monitor h and h  - s/p 3 U of prbc   - GI Consult appreciated- EGD and colonoscopy 3/28  - s/p IV iron    #HTN  - monitor blood pressure  - amlodipine and labetalol    #HLD  - statin    #DVT Prophylaxis  - venodynes    Spoke to patient at bedside. All questions answered. Advised with mass seen on ct scan and EGD/colonoscopy pending Monday

## 2022-03-27 NOTE — PROGRESS NOTE ADULT - SUBJECTIVE AND OBJECTIVE BOX
CC: Symptomatic anemia (27 Mar 2022 10:17)    INTERVAL HPI/OVERNIGHT EVENTS:  no acute events overnight  feels better today  still with dark stool  NPO after midnight for colonoscopy tomorrow    Vital Signs Last 24 Hrs  T(C): 36.7 (27 Mar 2022 11:20), Max: 37.2 (26 Mar 2022 20:25)  T(F): 98 (27 Mar 2022 11:20), Max: 98.9 (26 Mar 2022 20:25)  HR: 71 (27 Mar 2022 11:20) (63 - 74)  BP: 138/78 (27 Mar 2022 11:20) (131/80 - 138/78)  BP(mean): --  RR: 18 (27 Mar 2022 11:20) (18 - 18)  SpO2: 100% (27 Mar 2022 11:20) (98% - 100%)    PHYSICAL EXAM:  General: in no acute distress  Eyes: PERRLA, EOMI; conjunctiva and sclera clear  Head: Normocephalic; atraumatic  ENMT: No nasal discharge; airway clear  Neck: Supple; non tender; no masses  Respiratory: No wheezes, rales or rhonchi  Cardiovascular: Regular rate and rhythm. S1 and S2 Normal; No murmurs, gallops or rubs  Gastrointestinal: Soft non-tender non-distended; Normal bowel sounds  Genitourinary: No costovertebral angle tenderness  Extremities: Normal range of motion, No clubbing, cyanosis or edema  Vascular: Peripheral pulses palpable 2+ bilaterally  Neurological: Alert and oriented x4  Skin: Warm and dry. No acute rash  Psychiatric: Cooperative and appropriate    I&O's Detail                        8.5    7.47  )-----------( 383      ( 26 Mar 2022 08:44 )             30.1     26 Mar 2022 08:44    141    |  107    |  15.1   ----------------------------<  98     4.4     |  20.0   |  1.47     Ca    9.1        26 Mar 2022 08:44    TPro  6.7    /  Alb  3.6    /  TBili  0.3    /  DBili  x      /  AST  9      /  ALT  8      /  AlkPhos  93     26 Mar 2022 08:44    CAPILLARY BLOOD GLUCOSE    LIVER FUNCTIONS - ( 26 Mar 2022 08:44 )  Alb: 3.6 g/dL / Pro: 6.7 g/dL / ALK PHOS: 93 U/L / ALT: 8 U/L / AST: 9 U/L / GGT: x           MEDICATIONS  (STANDING):  amLODIPine   Tablet 10 milliGRAM(s) Oral daily  atorvastatin 20 milliGRAM(s) Oral at bedtime  bisacodyl 10 milliGRAM(s) Oral at bedtime  folic acid 1 milliGRAM(s) Oral daily  labetalol 200 milliGRAM(s) Oral three times a day  multivitamin 1 Tablet(s) Oral daily  pantoprazole  Injectable 40 milliGRAM(s) IV Push every 12 hours  polyethylene glycol/electrolyte Solution. 4000 milliLiter(s) Oral once  sodium chloride 0.9% lock flush 3 milliLiter(s) IV Push every 8 hours    MEDICATIONS  (PRN):  acetaminophen     Tablet .. 650 milliGRAM(s) Oral every 6 hours PRN Temp greater or equal to 38C (100.4F), Mild Pain (1 - 3)  aluminum hydroxide/magnesium hydroxide/simethicone Suspension 30 milliLiter(s) Oral every 4 hours PRN Dyspepsia  melatonin 3 milliGRAM(s) Oral at bedtime PRN Insomnia  ondansetron Injectable 4 milliGRAM(s) IV Push every 8 hours PRN Nausea and/or Vomiting      RADIOLOGY & ADDITIONAL TESTS:

## 2022-03-28 ENCOUNTER — RESULT REVIEW (OUTPATIENT)
Age: 42
End: 2022-03-28

## 2022-03-28 DIAGNOSIS — E11.9 TYPE 2 DIABETES MELLITUS WITHOUT COMPLICATIONS: ICD-10-CM

## 2022-03-28 DIAGNOSIS — D50.0 IRON DEFICIENCY ANEMIA SECONDARY TO BLOOD LOSS (CHRONIC): ICD-10-CM

## 2022-03-28 DIAGNOSIS — I10 ESSENTIAL (PRIMARY) HYPERTENSION: ICD-10-CM

## 2022-03-28 DIAGNOSIS — E78.5 HYPERLIPIDEMIA, UNSPECIFIED: ICD-10-CM

## 2022-03-28 DIAGNOSIS — D49.0 NEOPLASM OF UNSPECIFIED BEHAVIOR OF DIGESTIVE SYSTEM: ICD-10-CM

## 2022-03-28 LAB
ANION GAP SERPL CALC-SCNC: 14 MMOL/L — SIGNIFICANT CHANGE UP (ref 5–17)
BLD GP AB SCN SERPL QL: SIGNIFICANT CHANGE UP
BUN SERPL-MCNC: 16.5 MG/DL — SIGNIFICANT CHANGE UP (ref 8–20)
CALCIUM SERPL-MCNC: 9 MG/DL — SIGNIFICANT CHANGE UP (ref 8.6–10.2)
CHLORIDE SERPL-SCNC: 108 MMOL/L — HIGH (ref 98–107)
CO2 SERPL-SCNC: 20 MMOL/L — LOW (ref 22–29)
CREAT SERPL-MCNC: 1.34 MG/DL — HIGH (ref 0.5–1.3)
EGFR: 67 ML/MIN/1.73M2 — SIGNIFICANT CHANGE UP
GLUCOSE BLDC GLUCOMTR-MCNC: 85 MG/DL — SIGNIFICANT CHANGE UP (ref 70–99)
GLUCOSE SERPL-MCNC: 83 MG/DL — SIGNIFICANT CHANGE UP (ref 70–99)
HCT VFR BLD CALC: 30.4 % — LOW (ref 39–50)
HGB BLD-MCNC: 8.5 G/DL — LOW (ref 13–17)
INR BLD: 1.2 RATIO — HIGH (ref 0.88–1.16)
MCHC RBC-ENTMCNC: 19.1 PG — LOW (ref 27–34)
MCHC RBC-ENTMCNC: 28 GM/DL — LOW (ref 32–36)
MCV RBC AUTO: 68.2 FL — LOW (ref 80–100)
PLATELET # BLD AUTO: 360 K/UL — SIGNIFICANT CHANGE UP (ref 150–400)
POTASSIUM SERPL-MCNC: 4 MMOL/L — SIGNIFICANT CHANGE UP (ref 3.5–5.3)
POTASSIUM SERPL-SCNC: 4 MMOL/L — SIGNIFICANT CHANGE UP (ref 3.5–5.3)
PROTHROM AB SERPL-ACNC: 13.9 SEC — HIGH (ref 10.5–13.4)
RBC # BLD: 4.46 M/UL — SIGNIFICANT CHANGE UP (ref 4.2–5.8)
RBC # FLD: 28.1 % — HIGH (ref 10.3–14.5)
SARS-COV-2 RNA SPEC QL NAA+PROBE: SIGNIFICANT CHANGE UP
SODIUM SERPL-SCNC: 142 MMOL/L — SIGNIFICANT CHANGE UP (ref 135–145)
WBC # BLD: 6.55 K/UL — SIGNIFICANT CHANGE UP (ref 3.8–10.5)
WBC # FLD AUTO: 6.55 K/UL — SIGNIFICANT CHANGE UP (ref 3.8–10.5)

## 2022-03-28 PROCEDURE — 99232 SBSQ HOSP IP/OBS MODERATE 35: CPT | Mod: 25

## 2022-03-28 PROCEDURE — 88342 IMHCHEM/IMCYTCHM 1ST ANTB: CPT | Mod: 26

## 2022-03-28 PROCEDURE — 93010 ELECTROCARDIOGRAM REPORT: CPT

## 2022-03-28 PROCEDURE — 88305 TISSUE EXAM BY PATHOLOGIST: CPT | Mod: 26

## 2022-03-28 PROCEDURE — 45380 COLONOSCOPY AND BIOPSY: CPT | Mod: 59

## 2022-03-28 PROCEDURE — 71260 CT THORAX DX C+: CPT | Mod: 26

## 2022-03-28 PROCEDURE — 99232 SBSQ HOSP IP/OBS MODERATE 35: CPT

## 2022-03-28 PROCEDURE — 45381 COLONOSCOPY SUBMUCOUS NJX: CPT

## 2022-03-28 PROCEDURE — 88341 IMHCHEM/IMCYTCHM EA ADD ANTB: CPT | Mod: 26

## 2022-03-28 DEVICE — IMPLANTABLE DEVICE: Type: IMPLANTABLE DEVICE | Status: FUNCTIONAL

## 2022-03-28 RX ORDER — GLUCAGON INJECTION, SOLUTION 0.5 MG/.1ML
1 INJECTION, SOLUTION SUBCUTANEOUS ONCE
Refills: 0 | Status: DISCONTINUED | OUTPATIENT
Start: 2022-03-28 | End: 2022-04-01

## 2022-03-28 RX ORDER — DEXTROSE 50 % IN WATER 50 %
25 SYRINGE (ML) INTRAVENOUS ONCE
Refills: 0 | Status: DISCONTINUED | OUTPATIENT
Start: 2022-03-28 | End: 2022-04-01

## 2022-03-28 RX ORDER — DEXTROSE 50 % IN WATER 50 %
12.5 SYRINGE (ML) INTRAVENOUS ONCE
Refills: 0 | Status: DISCONTINUED | OUTPATIENT
Start: 2022-03-28 | End: 2022-04-01

## 2022-03-28 RX ORDER — PANTOPRAZOLE SODIUM 20 MG/1
40 TABLET, DELAYED RELEASE ORAL
Refills: 0 | Status: DISCONTINUED | OUTPATIENT
Start: 2022-03-28 | End: 2022-04-01

## 2022-03-28 RX ORDER — IRON SUCROSE 20 MG/ML
100 INJECTION, SOLUTION INTRAVENOUS EVERY 24 HOURS
Refills: 0 | Status: COMPLETED | OUTPATIENT
Start: 2022-03-28 | End: 2022-03-30

## 2022-03-28 RX ORDER — DEXTROSE 50 % IN WATER 50 %
15 SYRINGE (ML) INTRAVENOUS ONCE
Refills: 0 | Status: DISCONTINUED | OUTPATIENT
Start: 2022-03-28 | End: 2022-04-01

## 2022-03-28 RX ORDER — SODIUM CHLORIDE 9 MG/ML
1000 INJECTION, SOLUTION INTRAVENOUS
Refills: 0 | Status: DISCONTINUED | OUTPATIENT
Start: 2022-03-28 | End: 2022-03-28

## 2022-03-28 RX ORDER — SODIUM CHLORIDE 9 MG/ML
1000 INJECTION, SOLUTION INTRAVENOUS
Refills: 0 | Status: DISCONTINUED | OUTPATIENT
Start: 2022-03-28 | End: 2022-04-01

## 2022-03-28 RX ADMIN — ATORVASTATIN CALCIUM 20 MILLIGRAM(S): 80 TABLET, FILM COATED ORAL at 21:09

## 2022-03-28 RX ADMIN — Medication 1 TABLET(S): at 17:40

## 2022-03-28 RX ADMIN — AMLODIPINE BESYLATE 10 MILLIGRAM(S): 2.5 TABLET ORAL at 05:34

## 2022-03-28 RX ADMIN — Medication 10 MILLIGRAM(S): at 21:09

## 2022-03-28 RX ADMIN — Medication 200 MILLIGRAM(S): at 05:35

## 2022-03-28 RX ADMIN — Medication 1 MILLIGRAM(S): at 17:41

## 2022-03-28 RX ADMIN — SODIUM CHLORIDE 3 MILLILITER(S): 9 INJECTION INTRAMUSCULAR; INTRAVENOUS; SUBCUTANEOUS at 05:34

## 2022-03-28 RX ADMIN — PANTOPRAZOLE SODIUM 40 MILLIGRAM(S): 20 TABLET, DELAYED RELEASE ORAL at 05:35

## 2022-03-28 RX ADMIN — SODIUM CHLORIDE 3 MILLILITER(S): 9 INJECTION INTRAMUSCULAR; INTRAVENOUS; SUBCUTANEOUS at 21:14

## 2022-03-28 RX ADMIN — IRON SUCROSE 210 MILLIGRAM(S): 20 INJECTION, SOLUTION INTRAVENOUS at 17:42

## 2022-03-28 RX ADMIN — Medication 200 MILLIGRAM(S): at 21:09

## 2022-03-28 RX ADMIN — SODIUM CHLORIDE 3 MILLILITER(S): 9 INJECTION INTRAMUSCULAR; INTRAVENOUS; SUBCUTANEOUS at 11:20

## 2022-03-28 RX ADMIN — SODIUM CHLORIDE 75 MILLILITER(S): 9 INJECTION, SOLUTION INTRAVENOUS at 21:10

## 2022-03-28 NOTE — PROGRESS NOTE ADULT - SUBJECTIVE AND OBJECTIVE BOX
HOSPITALIST PROGRESS NOTE    BLANCA GRIFFIN  [id number]  41yMale    Patient is a 41y old  Male who presents with a chief complaint of Symptomatic anemia (27 Mar 2022 14:50)      SUBJECTIVE:   Chart reviewed since admission  Patient seen and examined at bedside for anemia, colon mass.  Denies any nausea, vomiting or abdominal pain. Denies any BM or flatus since after coming up from procedure  Had Colonoscopy earlier - mass as per discussion with GI with recommendations for CRS involvement           OBJECTIVE:  Vital Signs Last 24 Hrs  T(C): 36.6 (28 Mar 2022 16:59), Max: 36.9 (28 Mar 2022 13:16)  T(F): 97.9 (28 Mar 2022 16:59), Max: 98.5 (28 Mar 2022 13:16)  HR: 64 (28 Mar 2022 16:59) (61 - 79)  BP: 150/82 (28 Mar 2022 16:59) (126/75 - 167/89)    RR: 18 (28 Mar 2022 16:59) (18 - 18)  SpO2: 100% (28 Mar 2022 16:59) (97% - 100%)    PHYSICAL EXAMINATION  General: Middle aged male sitting in bed, NAD  HEENT:  EOMI  NECK:  Supple  CVS: regular rate and rhythm S1 S2  RESP:  CTAB  GI:  Soft nondistended nontender BS+  : No suprapubic tenderness  MSK:  FROM  CNS:  No gross focal or global deficit appreciated  INTEG:  warm dry skin  PSYCH:  Fair mood    MONITOR: SR  CAPILLARY BLOOD GLUCOSE      POCT Blood Glucose.: 85 mg/dL (28 Mar 2022 10:48)        I&O's Summary                          8.5    6.55  )-----------( 360      ( 28 Mar 2022 06:04 )             30.4     PT/INR - ( 28 Mar 2022 06:04 )   PT: 13.9 sec;   INR: 1.20 ratio           03-28    142  |  108<H>  |  16.5  ----------------------------<  83  4.0   |  20.0<L>  |  1.34<H>    Ca    9.0      28 Mar 2022 06:04              Culture:    TTE:    RADIOLOGY  < from: CT Abdomen and Pelvis w/ Oral Cont and w/ IV Cont (03.24.22 @ 17:43) >  IMPRESSION:  Colocolonic intussusception at the proximal to mid descending colon   secondary to a focal colonic mass with adjacent pericolonic lymph nodes   with findings suggesting a partial large bowel obstruction, delayed   abdominal x-rays can be obtained to assess for transit of contrast into   the distal colon and rectum. Recommend direct visualization for further   evaluation        --- End of Report ---            QING CONTRERAS MD; Attending Radiologist  This document has been electronically signed. Mar 24 2022  8:00PM    < end of copied text >    Carcinoembryonic Antigen: 4.9:     MEDICATIONS  (STANDING):  amLODIPine   Tablet 10 milliGRAM(s) Oral daily  atorvastatin 20 milliGRAM(s) Oral at bedtime  bisacodyl 10 milliGRAM(s) Oral at bedtime  dextrose 50% Injectable 25 Gram(s) IV Push once  dextrose 50% Injectable 12.5 Gram(s) IV Push once  dextrose 50% Injectable 25 Gram(s) IV Push once  dextrose Oral Gel 15 Gram(s) Oral once  folic acid 1 milliGRAM(s) Oral daily  glucagon  Injectable 1 milliGRAM(s) IntraMuscular once  iron sucrose IVPB 100 milliGRAM(s) IV Intermittent every 24 hours  labetalol 200 milliGRAM(s) Oral three times a day  lactated ringers. 1000 milliLiter(s) (75 mL/Hr) IV Continuous <Continuous>  multivitamin 1 Tablet(s) Oral daily  pantoprazole  Injectable 40 milliGRAM(s) IV Push every 12 hours  sodium chloride 0.9% lock flush 3 milliLiter(s) IV Push every 8 hours      MEDICATIONS  (PRN):  acetaminophen     Tablet .. 650 milliGRAM(s) Oral every 6 hours PRN Temp greater or equal to 38C (100.4F), Mild Pain (1 - 3)  aluminum hydroxide/magnesium hydroxide/simethicone Suspension 30 milliLiter(s) Oral every 4 hours PRN Dyspepsia  melatonin 3 milliGRAM(s) Oral at bedtime PRN Insomnia  ondansetron Injectable 4 milliGRAM(s) IV Push every 8 hours PRN Nausea and/or Vomiting

## 2022-03-28 NOTE — PROGRESS NOTE ADULT - ASSESSMENT
A/P - Left colon mass  Pt to undergo surgery with Dr. Perla this Friday, Robot/lap left colectomy.  Optimized by medical service.  To obtain CT chest for staging.  Will bowel prep on Thursday.

## 2022-03-28 NOTE — PROGRESS NOTE ADULT - SUBJECTIVE AND OBJECTIVE BOX
Colonoscopy today with findings of near obstructing sigmoid mass at 65 cm, biopsies pending. Unable to traverse mass.    MEDICATIONS  (STANDING):  amLODIPine   Tablet 10 milliGRAM(s) Oral daily  atorvastatin 20 milliGRAM(s) Oral at bedtime  bisacodyl 10 milliGRAM(s) Oral at bedtime  dextrose 50% Injectable 25 Gram(s) IV Push once  dextrose 50% Injectable 12.5 Gram(s) IV Push once  dextrose 50% Injectable 25 Gram(s) IV Push once  dextrose Oral Gel 15 Gram(s) Oral once  folic acid 1 milliGRAM(s) Oral daily  glucagon  Injectable 1 milliGRAM(s) IntraMuscular once  iron sucrose IVPB 100 milliGRAM(s) IV Intermittent every 24 hours  labetalol 200 milliGRAM(s) Oral three times a day  lactated ringers. 1000 milliLiter(s) (75 mL/Hr) IV Continuous <Continuous>  multivitamin 1 Tablet(s) Oral daily  pantoprazole    Tablet 40 milliGRAM(s) Oral before breakfast  sodium chloride 0.9% lock flush 3 milliLiter(s) IV Push every 8 hours    MEDICATIONS  (PRN):  acetaminophen     Tablet .. 650 milliGRAM(s) Oral every 6 hours PRN Temp greater or equal to 38C (100.4F), Mild Pain (1 - 3)  aluminum hydroxide/magnesium hydroxide/simethicone Suspension 30 milliLiter(s) Oral every 4 hours PRN Dyspepsia  melatonin 3 milliGRAM(s) Oral at bedtime PRN Insomnia  ondansetron Injectable 4 milliGRAM(s) IV Push every 8 hours PRN Nausea and/or Vomiting    Vital Signs Last 24 Hrs  T(C): 36.6 (28 Mar 2022 16:59), Max: 36.9 (28 Mar 2022 13:16)  T(F): 97.9 (28 Mar 2022 16:59), Max: 98.5 (28 Mar 2022 13:16)  HR: 64 (28 Mar 2022 16:59) (61 - 79)  BP: 150/82 (28 Mar 2022 16:59) (126/75 - 167/89)  BP(mean): --  RR: 18 (28 Mar 2022 16:59) (18 - 18)  SpO2: 100% (28 Mar 2022 16:59) (97% - 100%)  I&O's Detail                          8.5    6.55  )-----------( 360      ( 28 Mar 2022 06:04 )             30.4     03-28    142  |  108<H>  |  16.5  ----------------------------<  83  4.0   |  20.0<L>  |  1.34<H>    Ca    9.0      28 Mar 2022 06:04

## 2022-03-28 NOTE — PROGRESS NOTE ADULT - ASSESSMENT
41 year old male with PMH HTN, T2DM, Dyslipidemia sent in by PMD for Anemia  CT abdomen with proximal descending colonic mass with ?intussuception/ lbo. Clinically patient without obstruction. Elevated CEA 4.9 . Colonoscopy confirmed large obstructing colonic mass. R    Colon mass, likely malignant with chronic GI blood loss causing Iron deficiency anemia. Received PRBC x 3 during stay as well as IV iron.  - Clear liquid diet  - monitor Hgb  - IV Iron  - Change PPI to po daily  - CRS follow regarding surgery. No absolute medical contraindication for procedure anesthesia  - Follow up biopsy result    HTN  - Amlodipine 10mg, Labetalol 200mg    T2DM. A1c 6.3  - BGM with SSI    Dyslipidemia  - statin    DVT Prophylaxis  - VCD    Discussed with patient who was asking if 'tumor' benign. Explained that biopsy pending result. Discussed with GI, CRS recalled as mass obstructing.

## 2022-03-29 LAB
ANION GAP SERPL CALC-SCNC: 17 MMOL/L — SIGNIFICANT CHANGE UP (ref 5–17)
BUN SERPL-MCNC: 11.7 MG/DL — SIGNIFICANT CHANGE UP (ref 8–20)
CALCIUM SERPL-MCNC: 9.4 MG/DL — SIGNIFICANT CHANGE UP (ref 8.6–10.2)
CHLORIDE SERPL-SCNC: 104 MMOL/L — SIGNIFICANT CHANGE UP (ref 98–107)
CO2 SERPL-SCNC: 20 MMOL/L — LOW (ref 22–29)
CREAT SERPL-MCNC: 1.37 MG/DL — HIGH (ref 0.5–1.3)
EGFR: 66 ML/MIN/1.73M2 — SIGNIFICANT CHANGE UP
GLUCOSE SERPL-MCNC: 110 MG/DL — HIGH (ref 70–99)
HCT VFR BLD CALC: 33.5 % — LOW (ref 39–50)
HGB BLD-MCNC: 9.3 G/DL — LOW (ref 13–17)
MAGNESIUM SERPL-MCNC: 1.9 MG/DL — SIGNIFICANT CHANGE UP (ref 1.6–2.6)
MCHC RBC-ENTMCNC: 19.1 PG — LOW (ref 27–34)
MCHC RBC-ENTMCNC: 27.8 GM/DL — LOW (ref 32–36)
MCV RBC AUTO: 68.8 FL — LOW (ref 80–100)
PLATELET # BLD AUTO: 388 K/UL — SIGNIFICANT CHANGE UP (ref 150–400)
POTASSIUM SERPL-MCNC: 3.2 MMOL/L — LOW (ref 3.5–5.3)
POTASSIUM SERPL-SCNC: 3.2 MMOL/L — LOW (ref 3.5–5.3)
RBC # BLD: 4.87 M/UL — SIGNIFICANT CHANGE UP (ref 4.2–5.8)
RBC # FLD: 28.6 % — HIGH (ref 10.3–14.5)
SODIUM SERPL-SCNC: 141 MMOL/L — SIGNIFICANT CHANGE UP (ref 135–145)
WBC # BLD: 9.09 K/UL — SIGNIFICANT CHANGE UP (ref 3.8–10.5)
WBC # FLD AUTO: 9.09 K/UL — SIGNIFICANT CHANGE UP (ref 3.8–10.5)

## 2022-03-29 PROCEDURE — 99232 SBSQ HOSP IP/OBS MODERATE 35: CPT

## 2022-03-29 PROCEDURE — 99233 SBSQ HOSP IP/OBS HIGH 50: CPT

## 2022-03-29 PROCEDURE — 76775 US EXAM ABDO BACK WALL LIM: CPT | Mod: 26

## 2022-03-29 RX ORDER — POTASSIUM CHLORIDE 20 MEQ
10 PACKET (EA) ORAL
Refills: 0 | Status: COMPLETED | OUTPATIENT
Start: 2022-03-29 | End: 2022-03-29

## 2022-03-29 RX ORDER — LABETALOL HCL 100 MG
300 TABLET ORAL EVERY 8 HOURS
Refills: 0 | Status: DISCONTINUED | OUTPATIENT
Start: 2022-03-29 | End: 2022-04-01

## 2022-03-29 RX ORDER — METRONIDAZOLE 500 MG
500 TABLET ORAL
Refills: 0 | Status: DISCONTINUED | OUTPATIENT
Start: 2022-03-31 | End: 2022-04-01

## 2022-03-29 RX ORDER — SOD SULF/SODIUM/NAHCO3/KCL/PEG
1000 SOLUTION, RECONSTITUTED, ORAL ORAL EVERY 4 HOURS
Refills: 0 | Status: COMPLETED | OUTPATIENT
Start: 2022-03-31 | End: 2022-03-31

## 2022-03-29 RX ORDER — NEOMYCIN SULFATE 500 MG/1
1000 TABLET ORAL
Refills: 0 | Status: DISCONTINUED | OUTPATIENT
Start: 2022-03-31 | End: 2022-04-01

## 2022-03-29 RX ADMIN — Medication 300 MILLIGRAM(S): at 22:16

## 2022-03-29 RX ADMIN — PANTOPRAZOLE SODIUM 40 MILLIGRAM(S): 20 TABLET, DELAYED RELEASE ORAL at 05:24

## 2022-03-29 RX ADMIN — Medication 1 TABLET(S): at 13:18

## 2022-03-29 RX ADMIN — Medication 100 MILLIEQUIVALENT(S): at 14:11

## 2022-03-29 RX ADMIN — IRON SUCROSE 210 MILLIGRAM(S): 20 INJECTION, SOLUTION INTRAVENOUS at 17:47

## 2022-03-29 RX ADMIN — SODIUM CHLORIDE 3 MILLILITER(S): 9 INJECTION INTRAMUSCULAR; INTRAVENOUS; SUBCUTANEOUS at 11:20

## 2022-03-29 RX ADMIN — Medication 200 MILLIGRAM(S): at 05:24

## 2022-03-29 RX ADMIN — SODIUM CHLORIDE 3 MILLILITER(S): 9 INJECTION INTRAMUSCULAR; INTRAVENOUS; SUBCUTANEOUS at 07:18

## 2022-03-29 RX ADMIN — Medication 100 MILLIEQUIVALENT(S): at 10:21

## 2022-03-29 RX ADMIN — Medication 10 MILLIGRAM(S): at 22:17

## 2022-03-29 RX ADMIN — ATORVASTATIN CALCIUM 20 MILLIGRAM(S): 80 TABLET, FILM COATED ORAL at 22:16

## 2022-03-29 RX ADMIN — Medication 100 MILLIEQUIVALENT(S): at 13:11

## 2022-03-29 RX ADMIN — SODIUM CHLORIDE 3 MILLILITER(S): 9 INJECTION INTRAMUSCULAR; INTRAVENOUS; SUBCUTANEOUS at 21:32

## 2022-03-29 RX ADMIN — AMLODIPINE BESYLATE 10 MILLIGRAM(S): 2.5 TABLET ORAL at 05:24

## 2022-03-29 RX ADMIN — Medication 300 MILLIGRAM(S): at 13:18

## 2022-03-29 RX ADMIN — Medication 1 MILLIGRAM(S): at 13:15

## 2022-03-29 NOTE — PROGRESS NOTE ADULT - ASSESSMENT
A/P - Left near obstructing sigmoid mass at 65 cm, biopsies pending    Pt to undergo surgery with Dr. Perla on 4/1 , Robot/lap left colectomy.  Optimized by medical service.  To obtain CT chest for staging.  Will bowel prep on Thursday.

## 2022-03-29 NOTE — PROGRESS NOTE ADULT - PROBLEM SELECTOR PLAN 1
S/p colonoscopy revealing  a large obstructing sigmoid mass. Unable to pass scope beyond mass. Mass is 65 cm from the anal verge,  Polyp (5 mm) in the proximal rectum. (Biopsy).   Awaiting pathology results.   As per colorectal surgery patient to undergo robotic lap left colectomy on Friday.   Continue to follow up colorectal team recommendations.

## 2022-03-29 NOTE — PROGRESS NOTE ADULT - NS ATTEND AMEND GEN_ALL_CORE FT
41 year old male with PMH HTN, T2DM, Dyslipidemia sent in by PMD for Anemia, cscopeshowed obstructing sigmoid mass, colorectal surgery following, plan for surgery on Friday, further plan per surgical team, please contact GI as needed    labs/notes/imaging reviewed, plan discussed with np
Patient with marked iron deficiency anemia the etiology of which had been unclear but now it appears to be due to a descending colonic mass which is causing some intussusception and some partial large bowel obstruction.  As a result he could not take a prep last night.  At present he is once again asymptomatic and his physical exam is unremarkable with no evidence of abdominal distention.  We will try to reprep patient with a very slow prep so as to avoid any complications should he have some element of partial large bowel obstruction.  Our ability to perform a colonoscopy prior to any surgical intervention will depend upon whether or not he can tolerate an adequate prep.  We will start with a clear liquid diet on which she should continue in 2 Dulcolax at bedtime and then proceed based upon his response.
anemia, colon mass  Patient seen and examined with PA  no abdominal pain,  + BM  this am  tolerating po     Patient will get clear liquid X 2 days   mag citrate today and tomorrow.   Goltyley tomorrow

## 2022-03-29 NOTE — PROGRESS NOTE ADULT - SUBJECTIVE AND OBJECTIVE BOX
Patient is a 41y old  Male who presents with a chief complaint of Symptomatic anemia (29 Mar 2022 04:11)      INTERVAL HPI/OVERNIGHT EVENTS: Patient seen and evaluated at bedside, reporting no complaints, no overnight events, tolerating oral intake, Now s/p Colonoscopy showing large obstructing sigmoid mass 65 cm from anal verge and polyp in the proximal rectum (Biopsy). Denies nausea, vomiting, abdominal pain, chest pain, shortness of breath, hematemesis, hematochezia, melena.      MEDICATIONS  (STANDING):  amLODIPine   Tablet 10 milliGRAM(s) Oral daily  atorvastatin 20 milliGRAM(s) Oral at bedtime  bisacodyl 10 milliGRAM(s) Oral at bedtime  dextrose 50% Injectable 25 Gram(s) IV Push once  dextrose 50% Injectable 12.5 Gram(s) IV Push once  dextrose 50% Injectable 25 Gram(s) IV Push once  dextrose Oral Gel 15 Gram(s) Oral once  folic acid 1 milliGRAM(s) Oral daily  glucagon  Injectable 1 milliGRAM(s) IntraMuscular once  iron sucrose IVPB 100 milliGRAM(s) IV Intermittent every 24 hours  labetalol 300 milliGRAM(s) Oral every 8 hours  lactated ringers. 1000 milliLiter(s) (75 mL/Hr) IV Continuous <Continuous>  multivitamin 1 Tablet(s) Oral daily  pantoprazole    Tablet 40 milliGRAM(s) Oral before breakfast  potassium chloride  10 mEq/100 mL IVPB 10 milliEquivalent(s) IV Intermittent every 1 hour  sodium chloride 0.9% lock flush 3 milliLiter(s) IV Push every 8 hours    MEDICATIONS  (PRN):  acetaminophen     Tablet .. 650 milliGRAM(s) Oral every 6 hours PRN Temp greater or equal to 38C (100.4F), Mild Pain (1 - 3)  aluminum hydroxide/magnesium hydroxide/simethicone Suspension 30 milliLiter(s) Oral every 4 hours PRN Dyspepsia  melatonin 3 milliGRAM(s) Oral at bedtime PRN Insomnia  ondansetron Injectable 4 milliGRAM(s) IV Push every 8 hours PRN Nausea and/or Vomiting      Allergies    No Known Allergies    Intolerances    Review of Systems:  · ENMT: negative  · Respiratory and Thorax: negative  · Cardiovascular: negative  · Gastrointestinal: see above.  · Genitourinary:	negative  · Musculoskeletal: negative  · Neurological: negative  · Psychiatric: negative  · Hematology/Lymphatics: negative  · Endocrine: negative    Vital Signs Last 24 Hrs  T(C): 36.7 (29 Mar 2022 11:24), Max: 36.8 (28 Mar 2022 20:08)  T(F): 98.1 (29 Mar 2022 11:24), Max: 98.3 (28 Mar 2022 20:08)  HR: 62 (29 Mar 2022 13:17) (62 - 75)  BP: 147/82 (29 Mar 2022 13:17) (131/77 - 167/91)  BP(mean): 116 (28 Mar 2022 20:08) (116 - 116)  RR: 18 (29 Mar 2022 13:17) (18 - 20)  SpO2: 99% (29 Mar 2022 13:17) (98% - 100%)    PHYSICAL EXAM:  · Constitutional:  man, sitting comfortably, in no acute distress.   · Eyes: EOMI; PERRL; no drainage or redness  · ENMT: No oral lesions; no gross abnormalities  · Neck:	No bruits; no thyromegaly or nodules  · Back:	No deformity or limitation of movement  · Respiratory: Breath Sounds equal & clear to percussion & auscultation, no accessory muscle use  · Cardiovascular: Regular rate & rhythm, normal S1, S2; no murmurs, gallops or rubs; no S3, S4  · Gastrointestinal: Soft, non-tender, no hepatosplenomegaly, normal bowel sounds      LABS:                        9.3    9.09  )-----------( 388      ( 29 Mar 2022 07:41 )             33.5     03-29    141  |  104  |  11.7  ----------------------------<  110<H>  3.2<L>   |  20.0<L>  |  1.37<H>    Ca    9.4      29 Mar 2022 07:41  Mg     1.9     03-29      PT/INR - ( 28 Mar 2022 06:04 )   PT: 13.9 sec;   INR: 1.20 ratio             LIVER FUNCTIONS - ( 26 Mar 2022 08:44 )  Alb: 3.6 g/dL / Pro: 6.7 g/dL / ALK PHOS: 93 U/L / ALT: 8 U/L / AST: 9 U/L / GGT: x             RADIOLOGY & ADDITIONAL TESTS:

## 2022-03-29 NOTE — PROGRESS NOTE ADULT - SUBJECTIVE AND OBJECTIVE BOX
HOSPITALIST PROGRESS NOTE    BLANCA GRIFFIN  [id number]  41yMale    Patient is a 41y old  Male who presents with a chief complaint of Symptomatic anemia (29 Mar 2022 13:38)      SUBJECTIVE:   Chart reviewed since last visit.  Patient seen and examined at bedside for anemia, colon mass.  Denies any dizziness or dyspnea, nausea or vomiting  Occasional upper and lower pain.   Passing flatus and had small BM yesterday      OBJECTIVE:  Vital Signs Last 24 Hrs  T(C): 36.7 (29 Mar 2022 11:24), Max: 36.8 (28 Mar 2022 20:08)  T(F): 98.1 (29 Mar 2022 11:24), Max: 98.3 (28 Mar 2022 20:08)  HR: 62 (29 Mar 2022 13:17) (62 - 75)  BP: 147/82 (29 Mar 2022 13:17) (131/77 - 167/91)  BP(mean): 116 (28 Mar 2022 20:08) (116 - 116)  RR: 18 (29 Mar 2022 13:17) (18 - 20)  SpO2: 99% (29 Mar 2022 13:17) (98% - 100%)    PHYSICAL EXAMINATION  General: Middle aged male sitting in bed, NAD  HEENT:  EOMI  NECK:  Supple  CVS: regular rate and rhythm S1 S2  RESP:  CTAB  GI:  Soft nondistended nontender BS+  : No suprapubic tenderness  MSK:  FROM  CNS:  No gross focal or global deficit appreciated  INTEG:  warm dry skin  PSYCH:  Fair mood     MONITOR:  CAPILLARY BLOOD GLUCOSE            I&O's Summary                          9.3    9.09  )-----------( 388      ( 29 Mar 2022 07:41 )             33.5     PT/INR - ( 28 Mar 2022 06:04 )   PT: 13.9 sec;   INR: 1.20 ratio           03-29    141  |  104  |  11.7  ----------------------------<  110<H>  3.2<L>   |  20.0<L>  |  1.37<H>    Ca    9.4      29 Mar 2022 07:41  Mg     1.9     03-29              Culture:    TTE:    RADIOLOGY  < from: CT Chest w/ IV Cont (03.28.22 @ 18:11) >  IMPRESSION:    No evidence of metastatic disease in the chest.    --- End of Report ---    < end of copied text >        MEDICATIONS  (STANDING):  amLODIPine   Tablet 10 milliGRAM(s) Oral daily  atorvastatin 20 milliGRAM(s) Oral at bedtime  bisacodyl 10 milliGRAM(s) Oral at bedtime  dextrose 50% Injectable 25 Gram(s) IV Push once  dextrose 50% Injectable 12.5 Gram(s) IV Push once  dextrose 50% Injectable 25 Gram(s) IV Push once  dextrose Oral Gel 15 Gram(s) Oral once  folic acid 1 milliGRAM(s) Oral daily  glucagon  Injectable 1 milliGRAM(s) IntraMuscular once  iron sucrose IVPB 100 milliGRAM(s) IV Intermittent every 24 hours  labetalol 300 milliGRAM(s) Oral every 8 hours  lactated ringers. 1000 milliLiter(s) (75 mL/Hr) IV Continuous <Continuous>  multivitamin 1 Tablet(s) Oral daily  pantoprazole    Tablet 40 milliGRAM(s) Oral before breakfast  sodium chloride 0.9% lock flush 3 milliLiter(s) IV Push every 8 hours      MEDICATIONS  (PRN):  acetaminophen     Tablet .. 650 milliGRAM(s) Oral every 6 hours PRN Temp greater or equal to 38C (100.4F), Mild Pain (1 - 3)  aluminum hydroxide/magnesium hydroxide/simethicone Suspension 30 milliLiter(s) Oral every 4 hours PRN Dyspepsia  melatonin 3 milliGRAM(s) Oral at bedtime PRN Insomnia  ondansetron Injectable 4 milliGRAM(s) IV Push every 8 hours PRN Nausea and/or Vomiting

## 2022-03-29 NOTE — PROGRESS NOTE ADULT - SUBJECTIVE AND OBJECTIVE BOX
Subjective:    No acute events overnight. pt remains vitally stable.    MEDICATIONS  (STANDING):  amLODIPine   Tablet 10 milliGRAM(s) Oral daily  atorvastatin 20 milliGRAM(s) Oral at bedtime  bisacodyl 10 milliGRAM(s) Oral at bedtime  dextrose 50% Injectable 25 Gram(s) IV Push once  dextrose 50% Injectable 12.5 Gram(s) IV Push once  dextrose 50% Injectable 25 Gram(s) IV Push once  dextrose Oral Gel 15 Gram(s) Oral once  folic acid 1 milliGRAM(s) Oral daily  glucagon  Injectable 1 milliGRAM(s) IntraMuscular once  iron sucrose IVPB 100 milliGRAM(s) IV Intermittent every 24 hours  labetalol 200 milliGRAM(s) Oral three times a day  lactated ringers. 1000 milliLiter(s) (75 mL/Hr) IV Continuous <Continuous>  multivitamin 1 Tablet(s) Oral daily  pantoprazole    Tablet 40 milliGRAM(s) Oral before breakfast  sodium chloride 0.9% lock flush 3 milliLiter(s) IV Push every 8 hours    MEDICATIONS  (PRN):  acetaminophen     Tablet .. 650 milliGRAM(s) Oral every 6 hours PRN Temp greater or equal to 38C (100.4F), Mild Pain (1 - 3)  aluminum hydroxide/magnesium hydroxide/simethicone Suspension 30 milliLiter(s) Oral every 4 hours PRN Dyspepsia  melatonin 3 milliGRAM(s) Oral at bedtime PRN Insomnia  ondansetron Injectable 4 milliGRAM(s) IV Push every 8 hours PRN Nausea and/or Vomiting      Vital Signs Last 24 Hrs  T(C): 36.8 (28 Mar 2022 20:08), Max: 36.9 (28 Mar 2022 13:16)  T(F): 98.3 (28 Mar 2022 20:08), Max: 98.5 (28 Mar 2022 13:16)  HR: 66 (28 Mar 2022 20:08) (61 - 74)  BP: 167/91 (28 Mar 2022 20:08) (126/75 - 167/91)  BP(mean): 116 (28 Mar 2022 20:08) (116 - 116)  RR: 20 (28 Mar 2022 20:08) (18 - 20)  SpO2: 100% (28 Mar 2022 20:08) (97% - 100%)        Physical Exam:    General: Middle aged male sitting in bed, NAD  HEENT:  EOMI  NECK:  Supple  CVS: regular rate and rhythm S1 S2  RESP:  CTAB  GI:  Soft nondistended nontender BS+  : No suprapubic tenderness  MSK:  FROM  CNS:  No gross focal or global deficit appreciated  INTEG:  warm dry skin  PSYCH:  Fair mood        LABS:                        8.5    6.55  )-----------( 360      ( 28 Mar 2022 06:04 )             30.4     03-28    142  |  108<H>  |  16.5  ----------------------------<  83  4.0   |  20.0<L>  |  1.34<H>    Ca    9.0      28 Mar 2022 06:04      PT/INR - ( 28 Mar 2022 06:04 )   PT: 13.9 sec;   INR: 1.20 ratio

## 2022-03-29 NOTE — PROGRESS NOTE ADULT - ASSESSMENT
41 year old male with PMH HTN, T2DM, Dyslipidemia sent in by PMD for Anemia  CT abdomen with proximal descending colonic mass with ?intussuception/ lbo. Clinically patient without obstruction. Elevated CEA 4.9 . Colonoscopy confirmed large obstructing colonic mass. CT chest without any evidence of metastasis.    Colon mass, Clinically appears to be malignant with chronic GI blood loss causing Iron deficiency anemia. Received PRBC x 3 during stay as well as IV iron. Hgb improved. At risk for obstruction given lesion.   - Clear liquid diet advanced to full liquids  - monitor Hgb  - IV Iron  - Changed PPI to po daily  - CRS planning surgery 4/1/22  No absolute medical contraindication for procedure anesthesia  - Follow up biopsy result    HTN, uncontrolled  - Amlodipine 10mg, Labetalol 200mg increased to 300mg q8    T2DM. A1c 6.3  - BGM with SSI    Dyslipidemia  - statin    DVT Prophylaxis  - VCD    Discussed with patient who was asking if 'tumor' benign. Explained that biopsy pending result. However likely malignant, no evidence of other sites. Needs removal due to nature of mass.       41 year old male with PMH HTN, T2DM, Dyslipidemia sent in by PMD for Anemia  CT abdomen with proximal descending colonic mass with ?intussuception/ lbo. Clinically patient without obstruction. Elevated CEA 4.9 . Colonoscopy confirmed large obstructing colonic mass. CT chest without any evidence of metastasis.    Colon mass, Clinically appears to be malignant with chronic GI blood loss causing Iron deficiency anemia. Received PRBC x 3 during stay as well as IV iron. Hgb improved. At risk for obstruction given lesion.   - Clear liquid diet advanced to full liquids  - monitor Hgb  - IV Iron  - Changed PPI to po daily  - CRS planning surgery 4/1/22  No absolute medical contraindication for procedure anesthesia  - Follow up biopsy result    HTN, uncontrolled  - Amlodipine 10mg, Labetalol 200mg increased to 300mg q8    T2DM. A1c 6.3  - BGM with SSI    Dyslipidemia  - statin    DVT Prophylaxis  - VCD    Discussed with patient who was asking if 'tumor' benign. Explained that biopsy pending result. However likely malignant, no evidence of other sites. Needs removal due to nature of mass. Patient at risk of obstruction, thus will keep hospitalized till surgery

## 2022-03-30 LAB
ANION GAP SERPL CALC-SCNC: 14 MMOL/L — SIGNIFICANT CHANGE UP (ref 5–17)
BUN SERPL-MCNC: 11.2 MG/DL — SIGNIFICANT CHANGE UP (ref 8–20)
CALCIUM SERPL-MCNC: 9.2 MG/DL — SIGNIFICANT CHANGE UP (ref 8.6–10.2)
CHLORIDE SERPL-SCNC: 106 MMOL/L — SIGNIFICANT CHANGE UP (ref 98–107)
CO2 SERPL-SCNC: 20 MMOL/L — LOW (ref 22–29)
CREAT SERPL-MCNC: 1.17 MG/DL — SIGNIFICANT CHANGE UP (ref 0.5–1.3)
EGFR: 79 ML/MIN/1.73M2 — SIGNIFICANT CHANGE UP
GLUCOSE SERPL-MCNC: 148 MG/DL — HIGH (ref 70–99)
HCT VFR BLD CALC: 31.8 % — LOW (ref 39–50)
HGB BLD-MCNC: 8.9 G/DL — LOW (ref 13–17)
MAGNESIUM SERPL-MCNC: 1.8 MG/DL — SIGNIFICANT CHANGE UP (ref 1.6–2.6)
MCHC RBC-ENTMCNC: 19.4 PG — LOW (ref 27–34)
MCHC RBC-ENTMCNC: 28 GM/DL — LOW (ref 32–36)
MCV RBC AUTO: 69.4 FL — LOW (ref 80–100)
PLATELET # BLD AUTO: 321 K/UL — SIGNIFICANT CHANGE UP (ref 150–400)
POTASSIUM SERPL-MCNC: 4.1 MMOL/L — SIGNIFICANT CHANGE UP (ref 3.5–5.3)
POTASSIUM SERPL-SCNC: 4.1 MMOL/L — SIGNIFICANT CHANGE UP (ref 3.5–5.3)
RBC # BLD: 4.58 M/UL — SIGNIFICANT CHANGE UP (ref 4.2–5.8)
RBC # FLD: 29.1 % — HIGH (ref 10.3–14.5)
SODIUM SERPL-SCNC: 140 MMOL/L — SIGNIFICANT CHANGE UP (ref 135–145)
SURGICAL PATHOLOGY STUDY: SIGNIFICANT CHANGE UP
WBC # BLD: 7.97 K/UL — SIGNIFICANT CHANGE UP (ref 3.8–10.5)
WBC # FLD AUTO: 7.97 K/UL — SIGNIFICANT CHANGE UP (ref 3.8–10.5)

## 2022-03-30 PROCEDURE — 99232 SBSQ HOSP IP/OBS MODERATE 35: CPT

## 2022-03-30 RX ORDER — MAGNESIUM SULFATE 500 MG/ML
2 VIAL (ML) INJECTION ONCE
Refills: 0 | Status: COMPLETED | OUTPATIENT
Start: 2022-03-30 | End: 2022-03-30

## 2022-03-30 RX ADMIN — SODIUM CHLORIDE 75 MILLILITER(S): 9 INJECTION, SOLUTION INTRAVENOUS at 02:57

## 2022-03-30 RX ADMIN — SODIUM CHLORIDE 3 MILLILITER(S): 9 INJECTION INTRAMUSCULAR; INTRAVENOUS; SUBCUTANEOUS at 11:45

## 2022-03-30 RX ADMIN — Medication 300 MILLIGRAM(S): at 11:46

## 2022-03-30 RX ADMIN — SODIUM CHLORIDE 3 MILLILITER(S): 9 INJECTION INTRAMUSCULAR; INTRAVENOUS; SUBCUTANEOUS at 21:14

## 2022-03-30 RX ADMIN — Medication 10 MILLIGRAM(S): at 21:15

## 2022-03-30 RX ADMIN — Medication 300 MILLIGRAM(S): at 05:12

## 2022-03-30 RX ADMIN — Medication 300 MILLIGRAM(S): at 21:15

## 2022-03-30 RX ADMIN — SODIUM CHLORIDE 3 MILLILITER(S): 9 INJECTION INTRAMUSCULAR; INTRAVENOUS; SUBCUTANEOUS at 05:13

## 2022-03-30 RX ADMIN — PANTOPRAZOLE SODIUM 40 MILLIGRAM(S): 20 TABLET, DELAYED RELEASE ORAL at 05:12

## 2022-03-30 RX ADMIN — Medication 25 GRAM(S): at 14:49

## 2022-03-30 RX ADMIN — AMLODIPINE BESYLATE 10 MILLIGRAM(S): 2.5 TABLET ORAL at 05:12

## 2022-03-30 RX ADMIN — Medication 650 MILLIGRAM(S): at 19:20

## 2022-03-30 RX ADMIN — ATORVASTATIN CALCIUM 20 MILLIGRAM(S): 80 TABLET, FILM COATED ORAL at 21:15

## 2022-03-30 RX ADMIN — Medication 1 TABLET(S): at 11:46

## 2022-03-30 RX ADMIN — IRON SUCROSE 210 MILLIGRAM(S): 20 INJECTION, SOLUTION INTRAVENOUS at 17:42

## 2022-03-30 RX ADMIN — Medication 650 MILLIGRAM(S): at 20:29

## 2022-03-30 RX ADMIN — Medication 1 MILLIGRAM(S): at 11:46

## 2022-03-30 NOTE — PROGRESS NOTE ADULT - ASSESSMENT
Patient   S/p colonoscopy revealing  a large obstructing sigmoid mass. Unable to pass scope beyond mass. Mass is 65 cm from the anal verge,  Polyp (5 mm) in the proximal rectum. (Biopsy).   Awaiting pathology results.   patient to undergo robotic lap left colectomy on Friday.

## 2022-03-30 NOTE — PROGRESS NOTE ADULT - SUBJECTIVE AND OBJECTIVE BOX
Colorectal  Surgery Progress Note:  No acute overnight events. Patient afebrile, VSS. Pain well controlled. Tolerating diet. Denies n/v/f/c/cp/sob.     Diet, Full Liquid:   Supplement Feeding Modality:  Oral  Glucerna Shake Cans or Servings Per Day:  3       Frequency:  Daily (03-29-22 @ 11:12)      Scheduled Medications:   amLODIPine   Tablet 10 milliGRAM(s) Oral daily  atorvastatin 20 milliGRAM(s) Oral at bedtime  bisacodyl 10 milliGRAM(s) Oral at bedtime  dextrose 50% Injectable 25 Gram(s) IV Push once  dextrose 50% Injectable 12.5 Gram(s) IV Push once  dextrose 50% Injectable 25 Gram(s) IV Push once  dextrose Oral Gel 15 Gram(s) Oral once  folic acid 1 milliGRAM(s) Oral daily  glucagon  Injectable 1 milliGRAM(s) IntraMuscular once  iron sucrose IVPB 100 milliGRAM(s) IV Intermittent every 24 hours  labetalol 300 milliGRAM(s) Oral every 8 hours  lactated ringers. 1000 milliLiter(s) (75 mL/Hr) IV Continuous <Continuous>  multivitamin 1 Tablet(s) Oral daily  pantoprazole    Tablet 40 milliGRAM(s) Oral before breakfast  sodium chloride 0.9% lock flush 3 milliLiter(s) IV Push every 8 hours    PRN Medications:  acetaminophen     Tablet .. 650 milliGRAM(s) Oral every 6 hours PRN Temp greater or equal to 38C (100.4F), Mild Pain (1 - 3)  aluminum hydroxide/magnesium hydroxide/simethicone Suspension 30 milliLiter(s) Oral every 4 hours PRN Dyspepsia  melatonin 3 milliGRAM(s) Oral at bedtime PRN Insomnia  ondansetron Injectable 4 milliGRAM(s) IV Push every 8 hours PRN Nausea and/or Vomiting      Objective:   T(F): 97.9 (03-29 @ 19:49), Max: 98.1 (03-29 @ 11:24)  HR: 65 (03-29 @ 19:49) (62 - 75)  BP: 155/82 (03-29 @ 19:49) (131/77 - 155/82)  BP(mean): 106 (03-29 @ 19:49) (106 - 106)  ABP: --  ABP(mean): --  RR: 19 (03-29 @ 19:49) (18 - 19)  SpO2: 100% (03-29 @ 19:49) (98% - 100%)      Physical Exam:   GEN: patient resting comfortably in bed, in no acute distress  RESP: respirations are unlabored, no accessory muscle use, no conversational dyspnea  CVS: RRR  GI: Abdomen soft, non-tender, non-distended, no rebound tenderness / guarding    I&O's    03-29 @ 07:01  -  03-30 @ 03:09  --------------------------------------------------------  IN:    Lactated Ringers: 525 mL  Total IN: 525 mL    OUT:  Total OUT: 0 mL    Total NET: 525 mL          LABS:                        9.3    9.09  )-----------( 388      ( 29 Mar 2022 07:41 )             33.5     03-29    141  |  104  |  11.7  ----------------------------<  110<H>  3.2<L>   |  20.0<L>  |  1.37<H>    Ca    9.4      29 Mar 2022 07:41  Mg     1.9     03-29      PT/INR - ( 28 Mar 2022 06:04 )   PT: 13.9 sec;   INR: 1.20 ratio               MICROBIOLOGY:       PATHOLOGY:

## 2022-03-30 NOTE — PROGRESS NOTE ADULT - PROBLEM SELECTOR PLAN 1
S/p colonoscopy revealing  a large obstructing sigmoid mass. Unable to pass scope beyond mass. Mass is 65 cm from the anal verge,  Polyp (5 mm) in the proximal rectum. (Biopsy).   Awaiting pathology results.   Hemoglobin remains stable, continue to monitor daily CBC.   As per colorectal surgery patient to undergo robotic lap left colectomy on Friday.   No further GI interventions or recommendations. S/p colonoscopy revealing  a large obstructing sigmoid mass. Unable to pass scope beyond mass. Mass is 65 cm from the anal verge,  Polyp (5 mm) in the proximal rectum. (Biopsy).   Awaiting pathology results. Addendum- +adenocarcinoma  Hemoglobin remains stable, continue to monitor daily CBC.   As per colorectal surgery patient to undergo robotic lap left colectomy on Friday.   No further GI interventions or recommendations.  will sign off-  f/u colonoscopy in 1 year

## 2022-03-30 NOTE — PROGRESS NOTE ADULT - ASSESSMENT
41 year old male with PMH HTN, T2DM, Dyslipidemia sent in by PMD for Anemia  CT abdomen with proximal descending colonic mass with ?intussuception/ lbo. Clinically patient without obstruction. Elevated CEA 4.9 . Colonoscopy confirmed large obstructing colonic mass. CT chest without any evidence of metastasis.    Colon mass, Clinically appears to be malignant with chronic GI blood loss causing Iron deficiency anemia. Received PRBC x 3 during stay as well as IV iron. Hgb improved. At risk for obstruction given lesion.   - Clear liquid diet advanced to full liquids  - monitor Hgb  - IV Iron  - Changed PPI to po daily  - CRS planning surgery 4/1/22  No absolute medical contraindication for procedure anesthesia  - Follow up biopsy result    HTN, uncontrolled though improved  - Amlodipine 10mg, Labetalol 200mg increased to 300mg q8    T2DM. A1c 6.3  - BGM with SSI    Dyslipidemia  - statin    DVT Prophylaxis  - VCD    Discussed with patient who was asking if 'tumor' benign. Explained that biopsy pending result. However likely malignant, no evidence of other sites. Needs removal due to nature of mass. Patient at risk of obstruction, thus will keep hospitalized till surgery

## 2022-03-30 NOTE — PROGRESS NOTE ADULT - SUBJECTIVE AND OBJECTIVE BOX
Patient is a 41y old  Male who presents with a chief complaint of Symptomatic anemia (30 Mar 2022 03:09)      INTERVAL HPI/OVERNIGHT EVENTS: Patient seen and evaluated at bedside, reporting no complaints, no overnight events, tolerating oral intake, awaiting robotic lap left colectomy schedule for 4/1. Denies nausea, vomiting, abdominal pain, chest pain, shortness of breath, hematemesis, hematochezia, melena.      MEDICATIONS  (STANDING):  amLODIPine   Tablet 10 milliGRAM(s) Oral daily  atorvastatin 20 milliGRAM(s) Oral at bedtime  bisacodyl 10 milliGRAM(s) Oral at bedtime  dextrose 50% Injectable 25 Gram(s) IV Push once  dextrose 50% Injectable 12.5 Gram(s) IV Push once  dextrose 50% Injectable 25 Gram(s) IV Push once  dextrose Oral Gel 15 Gram(s) Oral once  folic acid 1 milliGRAM(s) Oral daily  glucagon  Injectable 1 milliGRAM(s) IntraMuscular once  iron sucrose IVPB 100 milliGRAM(s) IV Intermittent every 24 hours  labetalol 300 milliGRAM(s) Oral every 8 hours  lactated ringers. 1000 milliLiter(s) (75 mL/Hr) IV Continuous <Continuous>  multivitamin 1 Tablet(s) Oral daily  pantoprazole    Tablet 40 milliGRAM(s) Oral before breakfast  sodium chloride 0.9% lock flush 3 milliLiter(s) IV Push every 8 hours    MEDICATIONS  (PRN):  acetaminophen     Tablet .. 650 milliGRAM(s) Oral every 6 hours PRN Temp greater or equal to 38C (100.4F), Mild Pain (1 - 3)  aluminum hydroxide/magnesium hydroxide/simethicone Suspension 30 milliLiter(s) Oral every 4 hours PRN Dyspepsia  melatonin 3 milliGRAM(s) Oral at bedtime PRN Insomnia  ondansetron Injectable 4 milliGRAM(s) IV Push every 8 hours PRN Nausea and/or Vomiting      Allergies    No Known Allergies    Intolerances    Review of Systems:  · ENMT: negative  · Respiratory and Thorax: negative  · Cardiovascular: negative  · Gastrointestinal: see above.  · Genitourinary:	negative  · Musculoskeletal: negative  · Neurological: negative  · Psychiatric: negative  · Hematology/Lymphatics: negative  · Endocrine: negative        Vital Signs Last 24 Hrs  T(C): 36.8 (30 Mar 2022 10:11), Max: 36.8 (30 Mar 2022 10:11)  T(F): 98.3 (30 Mar 2022 10:11), Max: 98.3 (30 Mar 2022 10:11)  HR: 65 (30 Mar 2022 10:11) (62 - 72)  BP: 136/69 (30 Mar 2022 10:11) (136/69 - 155/82)  BP(mean): 106 (29 Mar 2022 19:49) (106 - 106)  RR: 19 (30 Mar 2022 10:11) (18 - 19)  SpO2: 99% (30 Mar 2022 10:11) (99% - 100%)    PHYSICAL EXAM:  . Constitutional:  man, sitting comfortably, in no acute distress.   · Eyes: EOMI; PERRL; no drainage or redness  · ENMT: No oral lesions; no gross abnormalities  · Neck:	No bruits; no thyromegaly or nodules  · Back:	No deformity or limitation of movement  · Respiratory: Breath Sounds equal & clear to percussion & auscultation, no accessory muscle use  · Cardiovascular: Regular rate & rhythm, normal S1, S2; no murmurs, gallops or rubs; no S3, S4  · Gastrointestinal: Soft, non-tender, no hepatosplenomegaly, normal bowel sounds    LABS:                        8.9    7.97  )-----------( 321      ( 30 Mar 2022 10:28 )             31.8     03-30    140  |  106  |  11.2  ----------------------------<  148<H>  4.1   |  20.0<L>  |  1.17    Ca    9.2      30 Mar 2022 10:28  Mg     1.8     03-30          LIVER FUNCTIONS - ( 26 Mar 2022 08:44 )  Alb: 3.6 g/dL / Pro: 6.7 g/dL / ALK PHOS: 93 U/L / ALT: 8 U/L / AST: 9 U/L / GGT: x

## 2022-03-30 NOTE — PROGRESS NOTE ADULT - SUBJECTIVE AND OBJECTIVE BOX
HOSPITALIST PROGRESS NOTE    BLANCA GRIFFIN  [id number]  41yMale    Patient is a 41y old  Male who presents with a chief complaint of Symptomatic anemia (30 Mar 2022 11:55)      SUBJECTIVE:   Chart reviewed since last visit.  Patient seen and examined at bedside for colon mass, anemia  Denies any abdominal pain, nausea or vomiting  Had BM  Denies any dizziness, palpitations, chest pain      OBJECTIVE:  Vital Signs Last 24 Hrs  T(C): 36.8 (30 Mar 2022 10:11), Max: 36.8 (30 Mar 2022 10:11)  T(F): 98.3 (30 Mar 2022 10:11), Max: 98.3 (30 Mar 2022 10:11)  HR: 65 (30 Mar 2022 10:11) (65 - 72)  BP: 136/69 (30 Mar 2022 10:11) (136/69 - 155/82)  BP(mean): 106 (29 Mar 2022 19:49) (106 - 106)  RR: 19 (30 Mar 2022 10:11) (18 - 19)  SpO2: 99% (30 Mar 2022 10:11) (99% - 100%)    PHYSICAL EXAMINATION  General: Middle aged male sitting in bed, NAD  HEENT:  EOMI  NECK:  Supple  CVS: regular rate and rhythm S1 S2  RESP:  CTAB  GI:  Soft nondistended nontender BS+  : No suprapubic tenderness  MSK:  FROM  CNS:  No gross focal or global deficit appreciated  INTEG:  warm dry skin  PSYCH:  Fair mood     MONITOR:  CAPILLARY BLOOD GLUCOSE            I&O's Summary    29 Mar 2022 07:01  -  30 Mar 2022 07:00  --------------------------------------------------------  IN: 1425 mL / OUT: 0 mL / NET: 1425 mL                            8.9    7.97  )-----------( 321      ( 30 Mar 2022 10:28 )             31.8       03-30    140  |  106  |  11.2  ----------------------------<  148<H>  4.1   |  20.0<L>  |  1.17    Ca    9.2      30 Mar 2022 10:28  Mg     1.8     03-30              Culture:    TTE:    RADIOLOGY        MEDICATIONS  (STANDING):  amLODIPine   Tablet 10 milliGRAM(s) Oral daily  atorvastatin 20 milliGRAM(s) Oral at bedtime  bisacodyl 10 milliGRAM(s) Oral at bedtime  dextrose 50% Injectable 25 Gram(s) IV Push once  dextrose 50% Injectable 12.5 Gram(s) IV Push once  dextrose 50% Injectable 25 Gram(s) IV Push once  dextrose Oral Gel 15 Gram(s) Oral once  folic acid 1 milliGRAM(s) Oral daily  glucagon  Injectable 1 milliGRAM(s) IntraMuscular once  iron sucrose IVPB 100 milliGRAM(s) IV Intermittent every 24 hours  labetalol 300 milliGRAM(s) Oral every 8 hours  lactated ringers. 1000 milliLiter(s) (75 mL/Hr) IV Continuous <Continuous>  multivitamin 1 Tablet(s) Oral daily  pantoprazole    Tablet 40 milliGRAM(s) Oral before breakfast  sodium chloride 0.9% lock flush 3 milliLiter(s) IV Push every 8 hours      MEDICATIONS  (PRN):  acetaminophen     Tablet .. 650 milliGRAM(s) Oral every 6 hours PRN Temp greater or equal to 38C (100.4F), Mild Pain (1 - 3)  aluminum hydroxide/magnesium hydroxide/simethicone Suspension 30 milliLiter(s) Oral every 4 hours PRN Dyspepsia  melatonin 3 milliGRAM(s) Oral at bedtime PRN Insomnia  ondansetron Injectable 4 milliGRAM(s) IV Push every 8 hours PRN Nausea and/or Vomiting

## 2022-03-31 ENCOUNTER — TRANSCRIPTION ENCOUNTER (OUTPATIENT)
Age: 42
End: 2022-03-31

## 2022-03-31 LAB
ANION GAP SERPL CALC-SCNC: 15 MMOL/L — SIGNIFICANT CHANGE UP (ref 5–17)
APTT BLD: 31.5 SEC — SIGNIFICANT CHANGE UP (ref 27.5–35.5)
BLD GP AB SCN SERPL QL: SIGNIFICANT CHANGE UP
BUN SERPL-MCNC: 12 MG/DL — SIGNIFICANT CHANGE UP (ref 8–20)
CALCIUM SERPL-MCNC: 8.8 MG/DL — SIGNIFICANT CHANGE UP (ref 8.6–10.2)
CHLORIDE SERPL-SCNC: 105 MMOL/L — SIGNIFICANT CHANGE UP (ref 98–107)
CO2 SERPL-SCNC: 20 MMOL/L — LOW (ref 22–29)
CREAT SERPL-MCNC: 1.15 MG/DL — SIGNIFICANT CHANGE UP (ref 0.5–1.3)
EGFR: 81 ML/MIN/1.73M2 — SIGNIFICANT CHANGE UP
GLUCOSE SERPL-MCNC: 104 MG/DL — HIGH (ref 70–99)
HCT VFR BLD CALC: 30.1 % — LOW (ref 39–50)
HGB BLD-MCNC: 8.6 G/DL — LOW (ref 13–17)
INR BLD: 1.18 RATIO — HIGH (ref 0.88–1.16)
MCHC RBC-ENTMCNC: 19.6 PG — LOW (ref 27–34)
MCHC RBC-ENTMCNC: 28.6 GM/DL — LOW (ref 32–36)
MCV RBC AUTO: 68.7 FL — LOW (ref 80–100)
PLATELET # BLD AUTO: 302 K/UL — SIGNIFICANT CHANGE UP (ref 150–400)
POTASSIUM SERPL-MCNC: 3.5 MMOL/L — SIGNIFICANT CHANGE UP (ref 3.5–5.3)
POTASSIUM SERPL-SCNC: 3.5 MMOL/L — SIGNIFICANT CHANGE UP (ref 3.5–5.3)
PROTHROM AB SERPL-ACNC: 13.7 SEC — HIGH (ref 10.5–13.4)
RBC # BLD: 4.38 M/UL — SIGNIFICANT CHANGE UP (ref 4.2–5.8)
RBC # FLD: 29.6 % — HIGH (ref 10.3–14.5)
SARS-COV-2 RNA SPEC QL NAA+PROBE: SIGNIFICANT CHANGE UP
SODIUM SERPL-SCNC: 140 MMOL/L — SIGNIFICANT CHANGE UP (ref 135–145)
WBC # BLD: 9.72 K/UL — SIGNIFICANT CHANGE UP (ref 3.8–10.5)
WBC # FLD AUTO: 9.72 K/UL — SIGNIFICANT CHANGE UP (ref 3.8–10.5)

## 2022-03-31 PROCEDURE — 99233 SBSQ HOSP IP/OBS HIGH 50: CPT

## 2022-03-31 RX ORDER — ENOXAPARIN SODIUM 100 MG/ML
40 INJECTION SUBCUTANEOUS EVERY 24 HOURS
Refills: 0 | Status: DISCONTINUED | OUTPATIENT
Start: 2022-03-31 | End: 2022-04-01

## 2022-03-31 RX ADMIN — Medication 300 MILLIGRAM(S): at 13:45

## 2022-03-31 RX ADMIN — SODIUM CHLORIDE 3 MILLILITER(S): 9 INJECTION INTRAMUSCULAR; INTRAVENOUS; SUBCUTANEOUS at 22:12

## 2022-03-31 RX ADMIN — AMLODIPINE BESYLATE 10 MILLIGRAM(S): 2.5 TABLET ORAL at 05:17

## 2022-03-31 RX ADMIN — ENOXAPARIN SODIUM 40 MILLIGRAM(S): 100 INJECTION SUBCUTANEOUS at 13:49

## 2022-03-31 RX ADMIN — SODIUM CHLORIDE 3 MILLILITER(S): 9 INJECTION INTRAMUSCULAR; INTRAVENOUS; SUBCUTANEOUS at 13:43

## 2022-03-31 RX ADMIN — Medication 300 MILLIGRAM(S): at 22:02

## 2022-03-31 RX ADMIN — Medication 300 MILLIGRAM(S): at 05:17

## 2022-03-31 RX ADMIN — Medication 1000 MILLILITER(S): at 17:47

## 2022-03-31 RX ADMIN — ATORVASTATIN CALCIUM 20 MILLIGRAM(S): 80 TABLET, FILM COATED ORAL at 22:02

## 2022-03-31 RX ADMIN — Medication 10 MILLIGRAM(S): at 22:03

## 2022-03-31 RX ADMIN — SODIUM CHLORIDE 3 MILLILITER(S): 9 INJECTION INTRAMUSCULAR; INTRAVENOUS; SUBCUTANEOUS at 06:32

## 2022-03-31 RX ADMIN — SODIUM CHLORIDE 75 MILLILITER(S): 9 INJECTION, SOLUTION INTRAVENOUS at 05:17

## 2022-03-31 RX ADMIN — Medication 1000 MILLILITER(S): at 22:03

## 2022-03-31 RX ADMIN — Medication 1 TABLET(S): at 13:45

## 2022-03-31 RX ADMIN — PANTOPRAZOLE SODIUM 40 MILLIGRAM(S): 20 TABLET, DELAYED RELEASE ORAL at 05:17

## 2022-03-31 RX ADMIN — Medication 1 MILLIGRAM(S): at 13:46

## 2022-03-31 NOTE — PROGRESS NOTE ADULT - TIME BILLING
Patient seen and examined, chart reviewed.  No complaints overnight.  No pain/nausea/vomiting/fever/chills.  Abdominal exam - soft, nontender, nondistended, no rebound or guarding.  Plan for OR tomorrow with Dr. Perla.    Plan for bowel prep tonight.  Type and screen updated.  COVID test repeated today - negative  Labs, imaging, EKG reviewed - stable anemia, no sign of distant metastatic disease, normal sinus rhythm  NPO after midnight, continue IV fluids  Please continue to administer Lovenox  OR tomorrow.
as above
I reviewed CT, labs, notes
I reviewed the CT  , labs, notes
Wrote prep orders. Explained the risks and benefits of colonoscopy to pt

## 2022-03-31 NOTE — PROGRESS NOTE ADULT - SUBJECTIVE AND OBJECTIVE BOX
CC: Symptomatic anemia (31 Mar 2022 02:42)    INTERVAL HPI/OVERNIGHT EVENTS:  no acute events overnight  patient nervous for procedure tomorrow  without acute complaints otherwise    Vital Signs Last 24 Hrs  T(C): 37.1 (31 Mar 2022 05:14), Max: 37.1 (31 Mar 2022 05:14)  T(F): 98.8 (31 Mar 2022 05:14), Max: 98.8 (31 Mar 2022 05:14)  HR: 77 (31 Mar 2022 05:14) (69 - 77)  BP: 147/82 (31 Mar 2022 05:14) (143/80 - 150/80)  BP(mean): --  RR: 18 (31 Mar 2022 05:14) (16 - 18)  SpO2: 98% (31 Mar 2022 05:14) (97% - 100%)    PHYSICAL EXAM:  General: in no acute distress  Eyes: PERRLA, EOMI; conjunctiva and sclera clear  Head: Normocephalic; atraumatic  ENMT: No nasal discharge; airway clear  Neck: Supple; non tender; no masses  Respiratory: No wheezes, rales or rhonchi  Cardiovascular: Regular rate and rhythm. S1 and S2 Normal; No murmurs, gallops or rubs  Gastrointestinal: Soft non-tender non-distended; Normal bowel sounds  Genitourinary: No costovertebral angle tenderness  Extremities: Normal range of motion, No clubbing, cyanosis or edema  Vascular: Peripheral pulses palpable 2+ bilaterally  Neurological: Alert and oriented x4  Skin: Warm and dry. No acute rash  Psychiatric: Cooperative and appropriate    I&O's Detail    30 Mar 2022 07:01  -  31 Mar 2022 07:00  --------------------------------------------------------  IN:    Lactated Ringers: 900 mL  Total IN: 900 mL    OUT:    Voided (mL): 1550 mL  Total OUT: 1550 mL    Total NET: -650 mL                        8.6    9.72  )-----------( 302      ( 31 Mar 2022 06:55 )             30.1     31 Mar 2022 06:55    140    |  105    |  12.0   ----------------------------<  104    3.5     |  20.0   |  1.15     Ca    8.8        31 Mar 2022 06:55  Mg     1.8       30 Mar 2022 10:28    PT/INR - ( 31 Mar 2022 06:55 )   PT: 13.7 sec;   INR: 1.18 ratio       PTT - ( 31 Mar 2022 06:55 )  PTT:31.5 sec  CAPILLARY BLOOD GLUCOSE    MEDICATIONS  (STANDING):  amLODIPine   Tablet 10 milliGRAM(s) Oral daily  atorvastatin 20 milliGRAM(s) Oral at bedtime  bisacodyl 10 milliGRAM(s) Oral at bedtime  dextrose 50% Injectable 25 Gram(s) IV Push once  dextrose 50% Injectable 12.5 Gram(s) IV Push once  dextrose 50% Injectable 25 Gram(s) IV Push once  dextrose Oral Gel 15 Gram(s) Oral once  enoxaparin Injectable 40 milliGRAM(s) SubCutaneous every 24 hours  folic acid 1 milliGRAM(s) Oral daily  glucagon  Injectable 1 milliGRAM(s) IntraMuscular once  labetalol 300 milliGRAM(s) Oral every 8 hours  lactated ringers. 1000 milliLiter(s) (75 mL/Hr) IV Continuous <Continuous>  metroNIDAZOLE    Tablet 500 milliGRAM(s) Oral <User Schedule>  multivitamin 1 Tablet(s) Oral daily  neomycin 1000 milliGRAM(s) Oral <User Schedule>  pantoprazole    Tablet 40 milliGRAM(s) Oral before breakfast  polyethylene glycol/electrolyte Solution 1000 milliLiter(s) Oral every 4 hours  sodium chloride 0.9% lock flush 3 milliLiter(s) IV Push every 8 hours    MEDICATIONS  (PRN):  acetaminophen     Tablet .. 650 milliGRAM(s) Oral every 6 hours PRN Temp greater or equal to 38C (100.4F), Mild Pain (1 - 3)  aluminum hydroxide/magnesium hydroxide/simethicone Suspension 30 milliLiter(s) Oral every 4 hours PRN Dyspepsia  melatonin 3 milliGRAM(s) Oral at bedtime PRN Insomnia  ondansetron Injectable 4 milliGRAM(s) IV Push every 8 hours PRN Nausea and/or Vomiting      RADIOLOGY & ADDITIONAL TESTS:

## 2022-03-31 NOTE — PROGRESS NOTE ADULT - ASSESSMENT
Patient 41M  S/p colonoscopy revealing  a large obstructing sigmoid mass. Unable to pass scope beyond mass. Mass is 65 cm from the anal verge,  Polyp (5 mm) in the proximal rectum. (Biopsy).  Awaiting pathology results. Patient to undergo robotic lap left colectomy on Friday.     Plan  Antibiotic prep and Moviprep ordered for today  NPO at MN  Pain control  Rest of care per primary team  OR 4/1 continued plan, will pre-op patient 3/31

## 2022-03-31 NOTE — PROGRESS NOTE ADULT - ASSESSMENT
41 year old male with PMH HTN, T2DM, Dyslipidemia sent in by PMD for Anemia  CT abdomen with proximal descending colonic mass with ?intussuception/ lbo. Clinically patient without obstruction. Elevated CEA 4.9 . Colonoscopy confirmed large obstructing colonic mass. CT chest without any evidence of metastasis.    #Colon mass, Clinically appears to be malignant with chronic GI blood loss causing Iron deficiency anemia.  At risk for obstruction given lesion.   - Clear liquid diet advanced to full liquids  - monitor Hgb  - IV Iron  - Changed PPI to po daily  - CRS planning surgery 4/1/22  No absolute medical contraindication for procedure anesthesia  - Follow up biopsy result  - without family history of disease  - would benefit from genetic testing however and family should also be screened - discussed with patient    #acute blood loss anemia  - Received PRBC x 3 during stay as well as IV iron. Hgb improved.  - check CBC in the AM    #HTN, uncontrolled though improved  - Amlodipine 10mg, Labetalol 200mg increased to 300mg q8    #T2DM. A1c 6.3  - BGM with SSI    #Dyslipidemia  - statin    #DVT Prophylaxis  - VCD    Discussed with patient and answered all questions

## 2022-03-31 NOTE — DIETITIAN INITIAL EVALUATION ADULT. - OTHER INFO
41 year old male with PMH HTN, T2DM, Dyslipidemia sent in by PMD for Anemia.  Colonoscopy confirmed large obstructing colonic mass. CT chest without any evidence of metastasis. Plan is colectomy 4/1.

## 2022-03-31 NOTE — DIETITIAN NUTRITION RISK NOTIFICATION - TREATMENT: THE FOLLOWING DIET HAS BEEN RECOMMENDED
Diet, NPO after Midnight:      NPO Start Date: 31-Mar-2022,   NPO Start Time: 23:59 (03-31-22 @ 12:58) [Active]  Diet, Clear Liquid (03-31-22 @ 12:58) [Active]

## 2022-03-31 NOTE — PROGRESS NOTE ADULT - PROBLEM SELECTOR PROBLEM 2
Iron deficiency anemia due to chronic blood loss

## 2022-03-31 NOTE — PROGRESS NOTE ADULT - SUBJECTIVE AND OBJECTIVE BOX
Colorectal Surgery Progress Note:  No acute overnight events. Patient afebrile, VSS. Pain well controlled. Tolerating diet. Denies n/v/f/c/cp/sob.   Patient to start antibiotics and moviprep today.    Diet, Full Liquid:   Supplement Feeding Modality:  Oral  Glucerna Shake Cans or Servings Per Day:  3       Frequency:  Daily (03-29-22 @ 11:12)      Scheduled Medications:   amLODIPine   Tablet 10 milliGRAM(s) Oral daily  atorvastatin 20 milliGRAM(s) Oral at bedtime  bisacodyl 10 milliGRAM(s) Oral at bedtime  dextrose 50% Injectable 25 Gram(s) IV Push once  dextrose 50% Injectable 12.5 Gram(s) IV Push once  dextrose 50% Injectable 25 Gram(s) IV Push once  dextrose Oral Gel 15 Gram(s) Oral once  folic acid 1 milliGRAM(s) Oral daily  glucagon  Injectable 1 milliGRAM(s) IntraMuscular once  labetalol 300 milliGRAM(s) Oral every 8 hours  lactated ringers. 1000 milliLiter(s) (75 mL/Hr) IV Continuous <Continuous>  metroNIDAZOLE    Tablet 500 milliGRAM(s) Oral <User Schedule>  multivitamin 1 Tablet(s) Oral daily  neomycin 1000 milliGRAM(s) Oral <User Schedule>  pantoprazole    Tablet 40 milliGRAM(s) Oral before breakfast  polyethylene glycol/electrolyte Solution 1000 milliLiter(s) Oral every 4 hours  sodium chloride 0.9% lock flush 3 milliLiter(s) IV Push every 8 hours    PRN Medications:  acetaminophen     Tablet .. 650 milliGRAM(s) Oral every 6 hours PRN Temp greater or equal to 38C (100.4F), Mild Pain (1 - 3)  aluminum hydroxide/magnesium hydroxide/simethicone Suspension 30 milliLiter(s) Oral every 4 hours PRN Dyspepsia  melatonin 3 milliGRAM(s) Oral at bedtime PRN Insomnia  ondansetron Injectable 4 milliGRAM(s) IV Push every 8 hours PRN Nausea and/or Vomiting      Objective:   T(F): 98.4 (03-30 @ 20:13), Max: 98.4 (03-30 @ 20:13)  HR: 70 (03-30 @ 20:13) (65 - 70)  BP: 150/80 (03-30 @ 20:13) (136/69 - 150/80)  BP(mean): --  ABP: --  ABP(mean): --  RR: 16 (03-30 @ 20:13) (16 - 19)  SpO2: 100% (03-30 @ 20:13) (97% - 100%)      Physical Exam:   GEN: patient resting comfortably in bed, in no acute distress  RESP: respirations are unlabored, no accessory muscle use, no conversational dyspnea  CVS: RRR  GI: Abdomen soft, tender to LLQ, non-distended, no rebound tenderness / guarding    I&O's    03-29 @ 07:01  -  03-30 @ 07:00  --------------------------------------------------------  IN:    Lactated Ringers: 1425 mL  Total IN: 1425 mL    OUT:  Total OUT: 0 mL    Total NET: 1425 mL      03-30 @ 07:01  -  03-31 @ 02:42  --------------------------------------------------------  IN:  Total IN: 0 mL    OUT:    Voided (mL): 775 mL  Total OUT: 775 mL    Total NET: -775 mL          LABS:                        8.9    7.97  )-----------( 321      ( 30 Mar 2022 10:28 )             31.8     03-30    140  |  106  |  11.2  ----------------------------<  148<H>  4.1   |  20.0<L>  |  1.17    Ca    9.2      30 Mar 2022 10:28  Mg     1.8     03-30            MICROBIOLOGY:       PATHOLOGY:  Surgical Pathology Report:   ACCESSION No:  [id number]      Accession:                             [id number]    Collected Date/Time:                   3/28/2022 17:00 EDT  Received Date/Time:                    3/29/2022 09:49 EDT    Surgical Pathology Report - Auth (Verified)    Specimen(s) Submitted  1  Descending colon mass biopsy  2  Rectal sigmoid polyp    Final Diagnosis  1. 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 Rectosigmoid, polypectomy:  -Tubular adenoma  Verified by: Breana Marlow MD  (Electronic Signature)  Reported on: 03/30/22 14:54 EDT, [facility], [address] [place] [id number]  _________________________________________________________________      Comment  MMR ([facility]) pending  This case represents a current recent malignant diagnosis. This patient  will be registered in the [facility] Cancer Registry Database in accordance with  [facility] Public Health Law 2401. The cancer registrar and or [facility] may contact  you for clinical follow up.    Dr. JOHNSON Chaves notified by encrypted email    Clinical Information  Anemia    Gross Description  1. Received in formalin labeled   "ascending colon mass biopsy"   are  five,  tan-pink, soft tissue fragments ranging from 0.2 cm to 0.4 cm in greatest  dimension. Entirely in one cassette: 1A.    2. Received in formalin labeled   "rectosigmoid polyp"  are four, tan-  pink, soft tissue fragments ranging from 0.1 cm to 0.5 cm in greatest  dimension. Entirely in one cassette: 2A.    Jessica James 03/29/2022 10:02 AM      Disclaimer  In addition to other data that may appear on the specimen containers, all  labels have been inspected to confirm the presence of the patient's name  and date ofbirth.    Histological Processing Performed at [facility],  Department of Pathology, [address] [place] [address]. (03-28-22 @ 17:00)

## 2022-03-31 NOTE — PROGRESS NOTE ADULT - PROBLEM SELECTOR PROBLEM 1
Neoplasm of descending colon
Anemia
Anemia
Neoplasm of descending colon
Anemia
Neoplasm of descending colon

## 2022-04-01 ENCOUNTER — RESULT REVIEW (OUTPATIENT)
Age: 42
End: 2022-04-01

## 2022-04-01 ENCOUNTER — TRANSCRIPTION ENCOUNTER (OUTPATIENT)
Age: 42
End: 2022-04-01

## 2022-04-01 ENCOUNTER — APPOINTMENT (OUTPATIENT)
Dept: COLORECTAL SURGERY | Facility: HOSPITAL | Age: 42
End: 2022-04-01

## 2022-04-01 LAB
ANION GAP SERPL CALC-SCNC: 14 MMOL/L — SIGNIFICANT CHANGE UP (ref 5–17)
BUN SERPL-MCNC: 13.2 MG/DL — SIGNIFICANT CHANGE UP (ref 8–20)
CALCIUM SERPL-MCNC: 9.2 MG/DL — SIGNIFICANT CHANGE UP (ref 8.6–10.2)
CHLORIDE SERPL-SCNC: 107 MMOL/L — SIGNIFICANT CHANGE UP (ref 98–107)
CO2 SERPL-SCNC: 19 MMOL/L — LOW (ref 22–29)
CREAT SERPL-MCNC: 1.26 MG/DL — SIGNIFICANT CHANGE UP (ref 0.5–1.3)
EGFR: 73 ML/MIN/1.73M2 — SIGNIFICANT CHANGE UP
GLUCOSE BLDC GLUCOMTR-MCNC: 109 MG/DL — HIGH (ref 70–99)
GLUCOSE BLDC GLUCOMTR-MCNC: 122 MG/DL — HIGH (ref 70–99)
GLUCOSE BLDC GLUCOMTR-MCNC: 233 MG/DL — HIGH (ref 70–99)
GLUCOSE BLDC GLUCOMTR-MCNC: 236 MG/DL — HIGH (ref 70–99)
GLUCOSE BLDC GLUCOMTR-MCNC: 251 MG/DL — HIGH (ref 70–99)
GLUCOSE SERPL-MCNC: 115 MG/DL — HIGH (ref 70–99)
HCT VFR BLD CALC: 31.9 % — LOW (ref 39–50)
HGB BLD-MCNC: 9.1 G/DL — LOW (ref 13–17)
MCHC RBC-ENTMCNC: 19.7 PG — LOW (ref 27–34)
MCHC RBC-ENTMCNC: 28.5 GM/DL — LOW (ref 32–36)
MCV RBC AUTO: 69 FL — LOW (ref 80–100)
PLATELET # BLD AUTO: 304 K/UL — SIGNIFICANT CHANGE UP (ref 150–400)
POTASSIUM SERPL-MCNC: 3.5 MMOL/L — SIGNIFICANT CHANGE UP (ref 3.5–5.3)
POTASSIUM SERPL-SCNC: 3.5 MMOL/L — SIGNIFICANT CHANGE UP (ref 3.5–5.3)
RBC # BLD: 4.62 M/UL — SIGNIFICANT CHANGE UP (ref 4.2–5.8)
RBC # FLD: 29.7 % — HIGH (ref 10.3–14.5)
SODIUM SERPL-SCNC: 140 MMOL/L — SIGNIFICANT CHANGE UP (ref 135–145)
WBC # BLD: 9.85 K/UL — SIGNIFICANT CHANGE UP (ref 3.8–10.5)
WBC # FLD AUTO: 9.85 K/UL — SIGNIFICANT CHANGE UP (ref 3.8–10.5)

## 2022-04-01 PROCEDURE — 88305 TISSUE EXAM BY PATHOLOGIST: CPT | Mod: 26

## 2022-04-01 PROCEDURE — 44213 LAP MOBIL SPLENIC FL ADD-ON: CPT

## 2022-04-01 PROCEDURE — 44213 LAP MOBIL SPLENIC FL ADD-ON: CPT | Mod: AS

## 2022-04-01 PROCEDURE — S2900 ROBOTIC SURGICAL SYSTEM: CPT | Mod: NC

## 2022-04-01 PROCEDURE — 44204 LAPARO PARTIAL COLECTOMY: CPT | Mod: AS

## 2022-04-01 PROCEDURE — 88309 TISSUE EXAM BY PATHOLOGIST: CPT | Mod: 26

## 2022-04-01 PROCEDURE — 44204 LAPARO PARTIAL COLECTOMY: CPT

## 2022-04-01 DEVICE — XI STAPLER RELOAD 45MM WHITE: Type: IMPLANTABLE DEVICE | Status: FUNCTIONAL

## 2022-04-01 DEVICE — STAPLER COVIDIEN TRI-STAPLE 60MM PURPLE RELOAD: Type: IMPLANTABLE DEVICE | Status: FUNCTIONAL

## 2022-04-01 DEVICE — IMPLANTABLE DEVICE: Type: IMPLANTABLE DEVICE | Status: FUNCTIONAL

## 2022-04-01 DEVICE — STAPLER COVIDIEN TRI-STAPLE 45MM PURPLE RELOAD: Type: IMPLANTABLE DEVICE | Status: FUNCTIONAL

## 2022-04-01 DEVICE — STAPLER COVIDIEN DST EEA XL 28-3.5MM: Type: IMPLANTABLE DEVICE | Status: FUNCTIONAL

## 2022-04-01 DEVICE — STAPLER COVIDIEN ENDO GIA 60-3.8MM BLUE: Type: IMPLANTABLE DEVICE | Status: FUNCTIONAL

## 2022-04-01 DEVICE — CLIP APPLIER COVIDIEN ENDOCLIP II 10MM MED/LG: Type: IMPLANTABLE DEVICE | Status: FUNCTIONAL

## 2022-04-01 DEVICE — XI STAPLER RELOAD 45MM BLUE: Type: IMPLANTABLE DEVICE | Status: FUNCTIONAL

## 2022-04-01 DEVICE — STAPLER COVIDIEN PURSE STRING 65MM DISP: Type: IMPLANTABLE DEVICE | Status: FUNCTIONAL

## 2022-04-01 RX ORDER — DEXTROSE 50 % IN WATER 50 %
25 SYRINGE (ML) INTRAVENOUS ONCE
Refills: 0 | Status: DISCONTINUED | OUTPATIENT
Start: 2022-04-01 | End: 2022-04-05

## 2022-04-01 RX ORDER — SODIUM CHLORIDE 9 MG/ML
1000 INJECTION, SOLUTION INTRAVENOUS
Refills: 0 | Status: DISCONTINUED | OUTPATIENT
Start: 2022-04-01 | End: 2022-04-05

## 2022-04-01 RX ORDER — ONDANSETRON 8 MG/1
4 TABLET, FILM COATED ORAL ONCE
Refills: 0 | Status: DISCONTINUED | OUTPATIENT
Start: 2022-04-01 | End: 2022-04-01

## 2022-04-01 RX ORDER — LABETALOL HCL 100 MG
200 TABLET ORAL THREE TIMES A DAY
Refills: 0 | Status: DISCONTINUED | OUTPATIENT
Start: 2022-04-01 | End: 2022-04-05

## 2022-04-01 RX ORDER — AMLODIPINE BESYLATE 2.5 MG/1
10 TABLET ORAL DAILY
Refills: 0 | Status: DISCONTINUED | OUTPATIENT
Start: 2022-04-01 | End: 2022-04-05

## 2022-04-01 RX ORDER — BUPIVACAINE 13.3 MG/ML
20 INJECTION, SUSPENSION, LIPOSOMAL INFILTRATION ONCE
Refills: 0 | Status: DISCONTINUED | OUTPATIENT
Start: 2022-04-01 | End: 2022-04-01

## 2022-04-01 RX ORDER — HYDRALAZINE HCL 50 MG
5 TABLET ORAL ONCE
Refills: 0 | Status: COMPLETED | OUTPATIENT
Start: 2022-04-01 | End: 2022-04-01

## 2022-04-01 RX ORDER — ACETAMINOPHEN 500 MG
1000 TABLET ORAL ONCE
Refills: 0 | Status: DISCONTINUED | OUTPATIENT
Start: 2022-04-01 | End: 2022-04-01

## 2022-04-01 RX ORDER — LANOLIN ALCOHOL/MO/W.PET/CERES
3 CREAM (GRAM) TOPICAL AT BEDTIME
Refills: 0 | Status: DISCONTINUED | OUTPATIENT
Start: 2022-04-01 | End: 2022-04-05

## 2022-04-01 RX ORDER — ENOXAPARIN SODIUM 100 MG/ML
30 INJECTION SUBCUTANEOUS EVERY 24 HOURS
Refills: 0 | Status: DISCONTINUED | OUTPATIENT
Start: 2022-04-01 | End: 2022-04-03

## 2022-04-01 RX ORDER — METOPROLOL TARTRATE 50 MG
5 TABLET ORAL ONCE
Refills: 0 | Status: COMPLETED | OUTPATIENT
Start: 2022-04-01 | End: 2022-04-01

## 2022-04-01 RX ORDER — HYDROMORPHONE HYDROCHLORIDE 2 MG/ML
0.5 INJECTION INTRAMUSCULAR; INTRAVENOUS; SUBCUTANEOUS
Refills: 0 | Status: DISCONTINUED | OUTPATIENT
Start: 2022-04-01 | End: 2022-04-01

## 2022-04-01 RX ORDER — SODIUM CHLORIDE 9 MG/ML
1000 INJECTION, SOLUTION INTRAVENOUS
Refills: 0 | Status: DISCONTINUED | OUTPATIENT
Start: 2022-04-01 | End: 2022-04-04

## 2022-04-01 RX ORDER — ACETAMINOPHEN 500 MG
650 TABLET ORAL EVERY 6 HOURS
Refills: 0 | Status: DISCONTINUED | OUTPATIENT
Start: 2022-04-01 | End: 2022-04-05

## 2022-04-01 RX ORDER — KETOROLAC TROMETHAMINE 30 MG/ML
15 SYRINGE (ML) INJECTION EVERY 6 HOURS
Refills: 0 | Status: DISCONTINUED | OUTPATIENT
Start: 2022-04-01 | End: 2022-04-05

## 2022-04-01 RX ORDER — ATORVASTATIN CALCIUM 80 MG/1
20 TABLET, FILM COATED ORAL AT BEDTIME
Refills: 0 | Status: DISCONTINUED | OUTPATIENT
Start: 2022-04-01 | End: 2022-04-05

## 2022-04-01 RX ORDER — OXYCODONE HYDROCHLORIDE 5 MG/1
5 TABLET ORAL EVERY 4 HOURS
Refills: 0 | Status: DISCONTINUED | OUTPATIENT
Start: 2022-04-01 | End: 2022-04-05

## 2022-04-01 RX ORDER — ONDANSETRON 8 MG/1
4 TABLET, FILM COATED ORAL ONCE
Refills: 0 | Status: DISCONTINUED | OUTPATIENT
Start: 2022-04-01 | End: 2022-04-05

## 2022-04-01 RX ORDER — GLUCAGON INJECTION, SOLUTION 0.5 MG/.1ML
1 INJECTION, SOLUTION SUBCUTANEOUS ONCE
Refills: 0 | Status: DISCONTINUED | OUTPATIENT
Start: 2022-04-01 | End: 2022-04-05

## 2022-04-01 RX ORDER — INSULIN LISPRO 100/ML
VIAL (ML) SUBCUTANEOUS
Refills: 0 | Status: DISCONTINUED | OUTPATIENT
Start: 2022-04-01 | End: 2022-04-05

## 2022-04-01 RX ORDER — HYDROMORPHONE HYDROCHLORIDE 2 MG/ML
0.5 INJECTION INTRAMUSCULAR; INTRAVENOUS; SUBCUTANEOUS EVERY 4 HOURS
Refills: 0 | Status: DISCONTINUED | OUTPATIENT
Start: 2022-04-01 | End: 2022-04-04

## 2022-04-01 RX ORDER — CELECOXIB 200 MG/1
400 CAPSULE ORAL ONCE
Refills: 0 | Status: DISCONTINUED | OUTPATIENT
Start: 2022-04-01 | End: 2022-04-01

## 2022-04-01 RX ORDER — DEXTROSE 50 % IN WATER 50 %
12.5 SYRINGE (ML) INTRAVENOUS ONCE
Refills: 0 | Status: DISCONTINUED | OUTPATIENT
Start: 2022-04-01 | End: 2022-04-05

## 2022-04-01 RX ORDER — HEPARIN SODIUM 5000 [USP'U]/ML
5000 INJECTION INTRAVENOUS; SUBCUTANEOUS ONCE
Refills: 0 | Status: DISCONTINUED | OUTPATIENT
Start: 2022-04-01 | End: 2022-04-01

## 2022-04-01 RX ORDER — SODIUM CHLORIDE 9 MG/ML
1000 INJECTION, SOLUTION INTRAVENOUS
Refills: 0 | Status: DISCONTINUED | OUTPATIENT
Start: 2022-04-01 | End: 2022-04-01

## 2022-04-01 RX ORDER — DEXTROSE 50 % IN WATER 50 %
15 SYRINGE (ML) INTRAVENOUS ONCE
Refills: 0 | Status: DISCONTINUED | OUTPATIENT
Start: 2022-04-01 | End: 2022-04-05

## 2022-04-01 RX ORDER — FOLIC ACID 0.8 MG
1 TABLET ORAL DAILY
Refills: 0 | Status: DISCONTINUED | OUTPATIENT
Start: 2022-04-01 | End: 2022-04-05

## 2022-04-01 RX ADMIN — Medication 5 MILLIGRAM(S): at 16:07

## 2022-04-01 RX ADMIN — ENOXAPARIN SODIUM 30 MILLIGRAM(S): 100 INJECTION SUBCUTANEOUS at 18:07

## 2022-04-01 RX ADMIN — OXYCODONE HYDROCHLORIDE 5 MILLIGRAM(S): 5 TABLET ORAL at 19:00

## 2022-04-01 RX ADMIN — SODIUM CHLORIDE 40 MILLILITER(S): 9 INJECTION, SOLUTION INTRAVENOUS at 18:07

## 2022-04-01 RX ADMIN — Medication 200 MILLIGRAM(S): at 18:06

## 2022-04-01 RX ADMIN — Medication 200 MILLIGRAM(S): at 21:53

## 2022-04-01 RX ADMIN — HYDROMORPHONE HYDROCHLORIDE 0.5 MILLIGRAM(S): 2 INJECTION INTRAMUSCULAR; INTRAVENOUS; SUBCUTANEOUS at 20:18

## 2022-04-01 RX ADMIN — HYDROMORPHONE HYDROCHLORIDE 0.5 MILLIGRAM(S): 2 INJECTION INTRAMUSCULAR; INTRAVENOUS; SUBCUTANEOUS at 16:21

## 2022-04-01 RX ADMIN — Medication 5 MILLIGRAM(S): at 16:38

## 2022-04-01 RX ADMIN — OXYCODONE HYDROCHLORIDE 5 MILLIGRAM(S): 5 TABLET ORAL at 18:06

## 2022-04-01 RX ADMIN — HYDROMORPHONE HYDROCHLORIDE 0.5 MILLIGRAM(S): 2 INJECTION INTRAMUSCULAR; INTRAVENOUS; SUBCUTANEOUS at 20:03

## 2022-04-01 RX ADMIN — ATORVASTATIN CALCIUM 20 MILLIGRAM(S): 80 TABLET, FILM COATED ORAL at 21:53

## 2022-04-01 RX ADMIN — HYDROMORPHONE HYDROCHLORIDE 0.5 MILLIGRAM(S): 2 INJECTION INTRAMUSCULAR; INTRAVENOUS; SUBCUTANEOUS at 16:12

## 2022-04-01 RX ADMIN — Medication 3: at 18:07

## 2022-04-01 NOTE — PROGRESS NOTE ADULT - SUBJECTIVE AND OBJECTIVE BOX
Post-op Check    Subjective:  Pt is few hours post robot-assisted left colectomy , no acute complaints at this time, endorse some abdominal pain around the incisions and some nausea . Dressing are in place and C/D/I , bronson in place. Pain well controlled on current regiment. Denies chest pain, SOB, palpitations.     STATUS POST:      POST OPERATIVE DAY #: 0    MEDICATIONS  (STANDING):  amLODIPine   Tablet 10 milliGRAM(s) Oral daily  atorvastatin 20 milliGRAM(s) Oral at bedtime  dextrose 5%. 1000 milliLiter(s) (100 mL/Hr) IV Continuous <Continuous>  dextrose 5%. 1000 milliLiter(s) (50 mL/Hr) IV Continuous <Continuous>  dextrose 50% Injectable 25 Gram(s) IV Push once  dextrose 50% Injectable 12.5 Gram(s) IV Push once  dextrose 50% Injectable 25 Gram(s) IV Push once  enoxaparin Injectable 30 milliGRAM(s) SubCutaneous every 24 hours  folic acid 1 milliGRAM(s) Oral daily  glucagon  Injectable 1 milliGRAM(s) IntraMuscular once  insulin lispro (ADMELOG) corrective regimen sliding scale   SubCutaneous three times a day before meals  labetalol 200 milliGRAM(s) Oral three times a day  lactated ringers. 1000 milliLiter(s) (40 mL/Hr) IV Continuous <Continuous>  multivitamin 1 Tablet(s) Oral daily    MEDICATIONS  (PRN):  acetaminophen     Tablet .. 650 milliGRAM(s) Oral every 6 hours PRN Mild Pain (1 - 3)  dextrose Oral Gel 15 Gram(s) Oral once PRN Blood Glucose LESS THAN 70 milliGRAM(s)/deciliter  HYDROmorphone  Injectable 0.5 milliGRAM(s) IV Push every 4 hours PRN Severe Pain (7 - 10)  ketorolac   Injectable 15 milliGRAM(s) IV Push every 6 hours PRN Mild Pain (1 - 3)  melatonin 3 milliGRAM(s) Oral at bedtime PRN Insomnia  ondansetron Injectable 4 milliGRAM(s) IV Push once PRN Nausea and/or Vomiting  oxyCODONE    IR 5 milliGRAM(s) Oral every 4 hours PRN Severe Pain (7 - 10)      Vital Signs Last 24 Hrs  T(C): 36.4 (01 Apr 2022 20:22), Max: 37.3 (01 Apr 2022 06:49)  T(F): 97.5 (01 Apr 2022 20:22), Max: 99.2 (01 Apr 2022 06:49)  HR: 81 (01 Apr 2022 20:22) (72 - 84)  BP: 145/87 (01 Apr 2022 20:22) (130/74 - 187/97)  BP(mean): --  RR: 16 (01 Apr 2022 20:22) (14 - 18)  SpO2: 93% (01 Apr 2022 20:22) (93% - 100%)    Physical Exam:    Constitutional: NAD  HEENT: PERRL, EOMI  Neck: No JVD, FROM without pain  Respiratory: no accessory muscle use, respirations non-labored  GI: Soft, mildly distended, appropriatly tender around incisions, VALENTÍN dressing in place C/D/I   Neurological: A&O x 3; without gross deficit

## 2022-04-01 NOTE — PROGRESS NOTE ADULT - SUBJECTIVE AND OBJECTIVE BOX
Acute Care Surgery/Colorectal Surgery Progress Note:    No acute overnight events. Patient afebrile, and hemodynamically stable. Pain well controlled. Tolerating diet. Denies n/v/f/c/cp/sob. Urine output 1550cc/24 h. Patient completed bowel preparation.    Diet, NPO after Midnight:      NPO Start Date: 31-Mar-2022,   NPO Start Time: 23:59 (03-31-22 @ 12:58)  Diet, Clear Liquid (03-31-22 @ 12:58)    Scheduled Medications:   amLODIPine   Tablet 10 milliGRAM(s) Oral daily  atorvastatin 20 milliGRAM(s) Oral at bedtime  bisacodyl 10 milliGRAM(s) Oral at bedtime  dextrose 50% Injectable 25 Gram(s) IV Push once  dextrose 50% Injectable 12.5 Gram(s) IV Push once  dextrose 50% Injectable 25 Gram(s) IV Push once  dextrose Oral Gel 15 Gram(s) Oral once  enoxaparin Injectable 40 milliGRAM(s) SubCutaneous every 24 hours  folic acid 1 milliGRAM(s) Oral daily  glucagon  Injectable 1 milliGRAM(s) IntraMuscular once  labetalol 300 milliGRAM(s) Oral every 8 hours  lactated ringers. 1000 milliLiter(s) (75 mL/Hr) IV Continuous <Continuous>  metroNIDAZOLE    Tablet 500 milliGRAM(s) Oral <User Schedule>  multivitamin 1 Tablet(s) Oral daily  neomycin 1000 milliGRAM(s) Oral <User Schedule>  pantoprazole    Tablet 40 milliGRAM(s) Oral before breakfast  sodium chloride 0.9% lock flush 3 milliLiter(s) IV Push every 8 hours    PRN Medications:  acetaminophen     Tablet .. 650 milliGRAM(s) Oral every 6 hours PRN Temp greater or equal to 38C (100.4F), Mild Pain (1 - 3)  aluminum hydroxide/magnesium hydroxide/simethicone Suspension 30 milliLiter(s) Oral every 4 hours PRN Dyspepsia  melatonin 3 milliGRAM(s) Oral at bedtime PRN Insomnia  ondansetron Injectable 4 milliGRAM(s) IV Push every 8 hours PRN Nausea and/or Vomiting      Objective:   T(F): 98 (03-31 @ 20:27), Max: 99.1 (03-31 @ 19:11)  HR: 82 (03-31 @ 20:27) (74 - 82)  BP: 154/82 (03-31 @ 20:27) (125/78 - 154/82)  BP(mean): --  ABP: --  ABP(mean): --  RR: 18 (03-31 @ 20:27) (18 - 18)  SpO2: 100% (03-31 @ 20:27) (97% - 100%)    Physical Exam:    GEN: patient  in no acute distress  RESP: respirations are unlabored, no accessory muscle use, no conversational dyspnea  CVS: RRR  GI: Abdomen soft, non-tender, non-distended, no rebound tenderness and no guarding.  Neuro:Oriented in self ,time and place.    I&O's    03-30 @ 07:01  -  03-31 @ 07:00  --------------------------------------------------------  IN:    Lactated Ringers: 900 mL  Total IN: 900 mL    OUT:    Voided (mL): 1550 mL  Total OUT: 1550 mL    Total NET: -650 mL          LABS:                        8.6    9.72  )-----------( 302      ( 31 Mar 2022 06:55 )             30.1     03-31    140  |  105  |  12.0  ----------------------------<  104<H>  3.5   |  20.0<L>  |  1.15    Ca    8.8      31 Mar 2022 06:55  Mg     1.8     03-30      PT/INR - ( 31 Mar 2022 06:55 )   PT: 13.7 sec;   INR: 1.18 ratio         PTT - ( 31 Mar 2022 06:55 )  PTT:31.5 sec      MICROBIOLOGY:       PATHOLOGY:  Surgical Pathology Report:   ACCESSION No:  [id number]      Accession:                             [id number]    Collected Date/Time:                   3/28/2022 17:00 EDT  Received Date/Time:                    3/29/2022 09:49 EDT    Surgical Pathology Report - Auth (Verified)    Specimen(s) Submitted  1  Descending colon mass biopsy  2  Rectal sigmoid polyp    Final Diagnosis  1. 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 Rectosigmoid, polypectomy:  -Tubular adenoma  Verified by: Breana Marlow MD  (Electronic Signature)  Reported on: 03/30/22 14:54 EDT, [facility], [address] [place] [id number]  _________________________________________________________________      Comment  MMR (I[facility]) pending  This case represents a current recent malignant diagnosis. This patient  will be registered in the [facility] Cancer Registry Database in accordance with  [facility] Public Health Law 2401. The cancer registrar and or [facility] may contact  you for clinical follow up.    Dr. JOHNSON Chaves notified by encrypted email    Clinical Information  Anemia    Gross Description  1. Received in formalin labeled   "ascending colon mass biopsy"   are  five,  tan-pink, soft tissue fragments ranging from 0.2 cm to 0.4 cm in greatest  dimension. Entirely in one cassette: 1A.    2. Received in formalin labeled   "rectosigmoid polyp"  are four, tan-  pink, soft tissue fragments ranging from 0.1 cm to 0.5 cm in greatest  dimension. Entirely in one cassette: 2A.    Jessica James 03/29/2022 10:02 AM      Disclaimer  In addition to other data that may appear on the specimen containers, all  labels have been inspected to confirm the presence of the patient's name  and date ofbirth.    Histological Processing Performed at [facility],  Department of Pathology, [address] [place] [address]. (03-28-22 @ 17:00)

## 2022-04-01 NOTE — PROVIDER CONTACT NOTE (MEDICATION) - SITUATION
PT c/o severe pain and nausea, Colorectal team contacted for prn nausea medication. Pt POD#0 s/p lap hemicolectomy. PRN breakthrough medication given. Awaiting orders

## 2022-04-01 NOTE — CHART NOTE - NSCHARTNOTEFT_GEN_A_CORE
Chart reviewed  Patient was taken to OR prior to being seen by me and transferred to surgical service upon arrival to post operative area.   Plan and care per surgical service.

## 2022-04-01 NOTE — PROGRESS NOTE ADULT - ASSESSMENT
A:  41M colonoscopy revealing  a large obstructing sigmoid mass. Unable to pass scope beyond mass. Mass is 65 cm from the anal verge,  Polyp (5 mm) in the proximal rectum. (Biopsy).  Awaiting pathology results. now patient is s/p robotic lap left colectomy patient tolerated procedure well .    P:  CLD   pain control   monitor UOP   monitor BF   encourage OOB   IS 10xhr   DVT prophylaxis -Lovenox

## 2022-04-01 NOTE — BRIEF OPERATIVE NOTE - ANTIBIOTIC PROTOCOL
Followed protocol Methotrexate Counseling:  Patient counseled regarding adverse effects of methotrexate including but not limited to nausea, vomiting, abnormalities in liver function tests. Patients may develop mouth sores, rash, diarrhea, and abnormalities in blood counts. The patient understands that monitoring is required including LFT's and blood counts.  There is a rare possibility of scarring of the liver and lung problems that can occur when taking methotrexate. Persistent nausea, loss of appetite, pale stools, dark urine, cough, and shortness of breath should be reported immediately. Patient advised to discontinue methotrexate treatment at least three months before attempting to become pregnant.  I discussed the need for folate supplements while taking methotrexate.  These supplements can decrease side effects during methotrexate treatment. The patient verbalized understanding of the proper use and possible adverse effects of methotrexate.  All of the patient's questions and concerns were addressed.

## 2022-04-01 NOTE — BRIEF OPERATIVE NOTE - OPERATION/FINDINGS
wc 3. abdomen entered using veress technique at umbilicus, ports placed under direct visualization. the robot was docked and dissection of left  colon performed , ureters identified, PAUL vessels were ligated using robotic stapler, mesocolon dissected towards splenic flexure. The mass had previously been inked by colonoscopy and found approx 60 cm from rectum. distal margin dissected and stapled with robotic stapler, additional extensive mobilization of transverse colon performed for tension free anastomosis. . Abdomen desufflated, umbilical incision extended and distal margin removed from abdomen through gel port, proximal colon stapled and cut with raoul stapler 6 cm proximal from the mass, purse string device used and anvil placed intraluminally. distal edge of colon cleared. Colon placed back in abdomen and gel port placed again, abdomen insufflated. 28 EEA stapler used for anastamosis, proctosigmoidoscopy performed to observe for leak which was negative.   umbilical incision fascia closed with loop pds,   RLQ 12 mm port fascia closed with vicryl suture  skin closed with monocryl

## 2022-04-01 NOTE — PROGRESS NOTE ADULT - ASSESSMENT
This is a 42 y/o male with chronic anemia, underwent colonoscopy and biopsy reporting invasive adenocarcinoma of th descending colon. Patient is scheduled for surgery this amby colorectal surgery.    Plan:    Patient is NPO.  IVF.  Pain medication.  IS postsurgery.  Postsurgical DVT prophylaxis mechanical/discuss starting chemical prophylaxis after surgery.  Consult PT/OT post surgery.  Discharge planing.

## 2022-04-02 LAB
ANION GAP SERPL CALC-SCNC: 15 MMOL/L — SIGNIFICANT CHANGE UP (ref 5–17)
ANISOCYTOSIS BLD QL: SIGNIFICANT CHANGE UP
BASOPHILS # BLD AUTO: 0 K/UL — SIGNIFICANT CHANGE UP (ref 0–0.2)
BASOPHILS NFR BLD AUTO: 0 % — SIGNIFICANT CHANGE UP (ref 0–2)
BUN SERPL-MCNC: 13.8 MG/DL — SIGNIFICANT CHANGE UP (ref 8–20)
BURR CELLS BLD QL SMEAR: PRESENT — SIGNIFICANT CHANGE UP
CALCIUM SERPL-MCNC: 9.4 MG/DL — SIGNIFICANT CHANGE UP (ref 8.6–10.2)
CHLORIDE SERPL-SCNC: 103 MMOL/L — SIGNIFICANT CHANGE UP (ref 98–107)
CO2 SERPL-SCNC: 21 MMOL/L — LOW (ref 22–29)
CREAT SERPL-MCNC: 1.17 MG/DL — SIGNIFICANT CHANGE UP (ref 0.5–1.3)
EGFR: 79 ML/MIN/1.73M2 — SIGNIFICANT CHANGE UP
ELLIPTOCYTES BLD QL SMEAR: SLIGHT — SIGNIFICANT CHANGE UP
EOSINOPHIL # BLD AUTO: 0.12 K/UL — SIGNIFICANT CHANGE UP (ref 0–0.5)
EOSINOPHIL NFR BLD AUTO: 0.9 % — SIGNIFICANT CHANGE UP (ref 0–6)
GIANT PLATELETS BLD QL SMEAR: PRESENT — SIGNIFICANT CHANGE UP
GLUCOSE BLDC GLUCOMTR-MCNC: 145 MG/DL — HIGH (ref 70–99)
GLUCOSE BLDC GLUCOMTR-MCNC: 158 MG/DL — HIGH (ref 70–99)
GLUCOSE BLDC GLUCOMTR-MCNC: 162 MG/DL — HIGH (ref 70–99)
GLUCOSE BLDC GLUCOMTR-MCNC: 182 MG/DL — HIGH (ref 70–99)
GLUCOSE SERPL-MCNC: 158 MG/DL — HIGH (ref 70–99)
HCT VFR BLD CALC: 32.4 % — LOW (ref 39–50)
HGB BLD-MCNC: 9.4 G/DL — LOW (ref 13–17)
HYPOCHROMIA BLD QL: SIGNIFICANT CHANGE UP
LYMPHOCYTES # BLD AUTO: 1.03 K/UL — SIGNIFICANT CHANGE UP (ref 1–3.3)
LYMPHOCYTES # BLD AUTO: 7.8 % — LOW (ref 13–44)
MACROCYTES BLD QL: SLIGHT — SIGNIFICANT CHANGE UP
MAGNESIUM SERPL-MCNC: 1.8 MG/DL — SIGNIFICANT CHANGE UP (ref 1.6–2.6)
MANUAL SMEAR VERIFICATION: SIGNIFICANT CHANGE UP
MCHC RBC-ENTMCNC: 20 PG — LOW (ref 27–34)
MCHC RBC-ENTMCNC: 29 GM/DL — LOW (ref 32–36)
MCV RBC AUTO: 69.1 FL — LOW (ref 80–100)
MICROCYTES BLD QL: SIGNIFICANT CHANGE UP
MONOCYTES # BLD AUTO: 0.81 K/UL — SIGNIFICANT CHANGE UP (ref 0–0.9)
MONOCYTES NFR BLD AUTO: 6.1 % — SIGNIFICANT CHANGE UP (ref 2–14)
NEUTROPHILS # BLD AUTO: 11.28 K/UL — HIGH (ref 1.8–7.4)
NEUTROPHILS NFR BLD AUTO: 82.6 % — HIGH (ref 43–77)
NEUTS BAND # BLD: 2.6 % — SIGNIFICANT CHANGE UP (ref 0–8)
OVALOCYTES BLD QL SMEAR: SLIGHT — SIGNIFICANT CHANGE UP
PHOSPHATE SERPL-MCNC: 3.6 MG/DL — SIGNIFICANT CHANGE UP (ref 2.4–4.7)
PLAT MORPH BLD: ABNORMAL
PLATELET # BLD AUTO: 269 K/UL — SIGNIFICANT CHANGE UP (ref 150–400)
POIKILOCYTOSIS BLD QL AUTO: SIGNIFICANT CHANGE UP
POLYCHROMASIA BLD QL SMEAR: SLIGHT — SIGNIFICANT CHANGE UP
POTASSIUM SERPL-MCNC: 4.2 MMOL/L — SIGNIFICANT CHANGE UP (ref 3.5–5.3)
POTASSIUM SERPL-SCNC: 4.2 MMOL/L — SIGNIFICANT CHANGE UP (ref 3.5–5.3)
RBC # BLD: 4.69 M/UL — SIGNIFICANT CHANGE UP (ref 4.2–5.8)
RBC # FLD: SIGNIFICANT CHANGE UP (ref 10.3–14.5)
RBC BLD AUTO: ABNORMAL
SODIUM SERPL-SCNC: 139 MMOL/L — SIGNIFICANT CHANGE UP (ref 135–145)
WBC # BLD: 13.24 K/UL — HIGH (ref 3.8–10.5)
WBC # FLD AUTO: 13.24 K/UL — HIGH (ref 3.8–10.5)

## 2022-04-02 RX ORDER — MAGNESIUM SULFATE 500 MG/ML
2 VIAL (ML) INJECTION ONCE
Refills: 0 | Status: COMPLETED | OUTPATIENT
Start: 2022-04-02 | End: 2022-04-02

## 2022-04-02 RX ADMIN — Medication 200 MILLIGRAM(S): at 13:38

## 2022-04-02 RX ADMIN — OXYCODONE HYDROCHLORIDE 5 MILLIGRAM(S): 5 TABLET ORAL at 18:49

## 2022-04-02 RX ADMIN — OXYCODONE HYDROCHLORIDE 5 MILLIGRAM(S): 5 TABLET ORAL at 14:38

## 2022-04-02 RX ADMIN — HYDROMORPHONE HYDROCHLORIDE 0.5 MILLIGRAM(S): 2 INJECTION INTRAMUSCULAR; INTRAVENOUS; SUBCUTANEOUS at 08:43

## 2022-04-02 RX ADMIN — Medication 200 MILLIGRAM(S): at 05:49

## 2022-04-02 RX ADMIN — Medication 1: at 17:51

## 2022-04-02 RX ADMIN — ATORVASTATIN CALCIUM 20 MILLIGRAM(S): 80 TABLET, FILM COATED ORAL at 21:56

## 2022-04-02 RX ADMIN — HYDROMORPHONE HYDROCHLORIDE 0.5 MILLIGRAM(S): 2 INJECTION INTRAMUSCULAR; INTRAVENOUS; SUBCUTANEOUS at 08:27

## 2022-04-02 RX ADMIN — OXYCODONE HYDROCHLORIDE 5 MILLIGRAM(S): 5 TABLET ORAL at 06:45

## 2022-04-02 RX ADMIN — HYDROMORPHONE HYDROCHLORIDE 0.5 MILLIGRAM(S): 2 INJECTION INTRAMUSCULAR; INTRAVENOUS; SUBCUTANEOUS at 14:57

## 2022-04-02 RX ADMIN — AMLODIPINE BESYLATE 10 MILLIGRAM(S): 2.5 TABLET ORAL at 05:50

## 2022-04-02 RX ADMIN — OXYCODONE HYDROCHLORIDE 5 MILLIGRAM(S): 5 TABLET ORAL at 22:40

## 2022-04-02 RX ADMIN — Medication 1 TABLET(S): at 08:26

## 2022-04-02 RX ADMIN — ENOXAPARIN SODIUM 30 MILLIGRAM(S): 100 INJECTION SUBCUTANEOUS at 17:49

## 2022-04-02 RX ADMIN — Medication 1: at 13:47

## 2022-04-02 RX ADMIN — OXYCODONE HYDROCHLORIDE 5 MILLIGRAM(S): 5 TABLET ORAL at 13:38

## 2022-04-02 RX ADMIN — OXYCODONE HYDROCHLORIDE 5 MILLIGRAM(S): 5 TABLET ORAL at 05:53

## 2022-04-02 RX ADMIN — Medication 3 MILLIGRAM(S): at 21:56

## 2022-04-02 RX ADMIN — HYDROMORPHONE HYDROCHLORIDE 0.5 MILLIGRAM(S): 2 INJECTION INTRAMUSCULAR; INTRAVENOUS; SUBCUTANEOUS at 15:13

## 2022-04-02 RX ADMIN — OXYCODONE HYDROCHLORIDE 5 MILLIGRAM(S): 5 TABLET ORAL at 21:56

## 2022-04-02 RX ADMIN — Medication 200 MILLIGRAM(S): at 22:00

## 2022-04-02 RX ADMIN — OXYCODONE HYDROCHLORIDE 5 MILLIGRAM(S): 5 TABLET ORAL at 17:49

## 2022-04-02 RX ADMIN — Medication 25 GRAM(S): at 13:37

## 2022-04-02 RX ADMIN — Medication 1 MILLIGRAM(S): at 08:26

## 2022-04-02 NOTE — PROGRESS NOTE ADULT - ASSESSMENT
41M colonoscopy revealing  a large obstructing sigmoid mass. Unable to pass scope beyond mass. Mass is 65 cm from the anal verge,  Polyp (5 mm) in the proximal rectum. (Biopsy).  Awaiting pathology results. now patient is POD1 s/p robotic lap left colectomy patient tolerated procedure well .    P:  CLD   pain control   monitor UOP   monitor BF   encourage OOB   IS 10xhr   DVT prophylaxis -Lovenox

## 2022-04-02 NOTE — PROGRESS NOTE ADULT - SUBJECTIVE AND OBJECTIVE BOX
HPI/OVERNIGHT EVENTS: Patient seen and examined at bedside this AM. POD1 of robot-assisted left colectomy ,endorse some abdominal pain around the incisions and some nausea . Dressing are in place and C/D/I , bronson in place with good UOP . tolerating clears , Pain well controlled on current regiment. Denies chest pain, SOB, palpitations.        Vital Signs Last 24 Hrs  T(C): 36.4 (01 Apr 2022 20:22), Max: 37.3 (01 Apr 2022 06:49)  T(F): 97.5 (01 Apr 2022 20:22), Max: 99.2 (01 Apr 2022 06:49)  HR: 81 (01 Apr 2022 20:22) (72 - 84)  BP: 145/87 (01 Apr 2022 20:22) (130/74 - 187/97)  BP(mean): --  RR: 16 (01 Apr 2022 20:22) (14 - 18)  SpO2: 93% (01 Apr 2022 20:22) (93% - 100%)    I&O's Detail    01 Apr 2022 07:01  -  02 Apr 2022 00:19  --------------------------------------------------------  IN:  Total IN: 0 mL    OUT:    Indwelling Catheter - Urethral (mL): 2200 mL  Total OUT: 2200 mL    Total NET: -2200 mL          Constitutional: NAD  HEENT: PERRL, EOMI  Neck: No JVD, FROM without pain  Respiratory: no accessory muscle use, respirations non-labored  GI: Soft, mildly distended, appropriatly tender around incisions, VALENTÍN dressing in place C/D/I   Neurological: A&O x 3; without gross deficit    LABS:                        9.1    9.85  )-----------( 304      ( 01 Apr 2022 07:06 )             31.9     04-01    140  |  107  |  13.2  ----------------------------<  115<H>  3.5   |  19.0<L>  |  1.26    Ca    9.2      01 Apr 2022 07:06      PT/INR - ( 31 Mar 2022 06:55 )   PT: 13.7 sec;   INR: 1.18 ratio         PTT - ( 31 Mar 2022 06:55 )  PTT:31.5 sec      MEDICATIONS  (STANDING):  amLODIPine   Tablet 10 milliGRAM(s) Oral daily  atorvastatin 20 milliGRAM(s) Oral at bedtime  dextrose 5%. 1000 milliLiter(s) (100 mL/Hr) IV Continuous <Continuous>  dextrose 5%. 1000 milliLiter(s) (50 mL/Hr) IV Continuous <Continuous>  dextrose 50% Injectable 25 Gram(s) IV Push once  dextrose 50% Injectable 12.5 Gram(s) IV Push once  dextrose 50% Injectable 25 Gram(s) IV Push once  enoxaparin Injectable 30 milliGRAM(s) SubCutaneous every 24 hours  folic acid 1 milliGRAM(s) Oral daily  glucagon  Injectable 1 milliGRAM(s) IntraMuscular once  insulin lispro (ADMELOG) corrective regimen sliding scale   SubCutaneous three times a day before meals  labetalol 200 milliGRAM(s) Oral three times a day  lactated ringers. 1000 milliLiter(s) (40 mL/Hr) IV Continuous <Continuous>  multivitamin 1 Tablet(s) Oral daily    MEDICATIONS  (PRN):  acetaminophen     Tablet .. 650 milliGRAM(s) Oral every 6 hours PRN Mild Pain (1 - 3)  dextrose Oral Gel 15 Gram(s) Oral once PRN Blood Glucose LESS THAN 70 milliGRAM(s)/deciliter  HYDROmorphone  Injectable 0.5 milliGRAM(s) IV Push every 4 hours PRN Severe Pain (7 - 10)  ketorolac   Injectable 15 milliGRAM(s) IV Push every 6 hours PRN Mild Pain (1 - 3)  melatonin 3 milliGRAM(s) Oral at bedtime PRN Insomnia  ondansetron Injectable 4 milliGRAM(s) IV Push once PRN Nausea and/or Vomiting  oxyCODONE    IR 5 milliGRAM(s) Oral every 4 hours PRN Severe Pain (7 - 10)      MICRO:   Cultures     STUDIES:   EKG, CXR, U/S, CT, MRI

## 2022-04-03 LAB
ANION GAP SERPL CALC-SCNC: 13 MMOL/L — SIGNIFICANT CHANGE UP (ref 5–17)
ANISOCYTOSIS BLD QL: SIGNIFICANT CHANGE UP
BASOPHILS # BLD AUTO: 0 K/UL — SIGNIFICANT CHANGE UP (ref 0–0.2)
BASOPHILS NFR BLD AUTO: 0 % — SIGNIFICANT CHANGE UP (ref 0–2)
BUN SERPL-MCNC: 14.5 MG/DL — SIGNIFICANT CHANGE UP (ref 8–20)
BURR CELLS BLD QL SMEAR: PRESENT — SIGNIFICANT CHANGE UP
CALCIUM SERPL-MCNC: 8.9 MG/DL — SIGNIFICANT CHANGE UP (ref 8.6–10.2)
CHLORIDE SERPL-SCNC: 105 MMOL/L — SIGNIFICANT CHANGE UP (ref 98–107)
CO2 SERPL-SCNC: 24 MMOL/L — SIGNIFICANT CHANGE UP (ref 22–29)
CREAT SERPL-MCNC: 1.13 MG/DL — SIGNIFICANT CHANGE UP (ref 0.5–1.3)
EGFR: 83 ML/MIN/1.73M2 — SIGNIFICANT CHANGE UP
ELLIPTOCYTES BLD QL SMEAR: SIGNIFICANT CHANGE UP
EOSINOPHIL # BLD AUTO: 0.69 K/UL — HIGH (ref 0–0.5)
EOSINOPHIL NFR BLD AUTO: 7.2 % — HIGH (ref 0–6)
GIANT PLATELETS BLD QL SMEAR: PRESENT — SIGNIFICANT CHANGE UP
GLUCOSE BLDC GLUCOMTR-MCNC: 135 MG/DL — HIGH (ref 70–99)
GLUCOSE BLDC GLUCOMTR-MCNC: 148 MG/DL — HIGH (ref 70–99)
GLUCOSE BLDC GLUCOMTR-MCNC: 154 MG/DL — HIGH (ref 70–99)
GLUCOSE BLDC GLUCOMTR-MCNC: 164 MG/DL — HIGH (ref 70–99)
GLUCOSE SERPL-MCNC: 119 MG/DL — HIGH (ref 70–99)
HCT VFR BLD CALC: 28.9 % — LOW (ref 39–50)
HGB BLD-MCNC: 8.4 G/DL — LOW (ref 13–17)
HYPOCHROMIA BLD QL: SIGNIFICANT CHANGE UP
LYMPHOCYTES # BLD AUTO: 1.46 K/UL — SIGNIFICANT CHANGE UP (ref 1–3.3)
LYMPHOCYTES # BLD AUTO: 15.3 % — SIGNIFICANT CHANGE UP (ref 13–44)
MACROCYTES BLD QL: SLIGHT — SIGNIFICANT CHANGE UP
MAGNESIUM SERPL-MCNC: 2.2 MG/DL — SIGNIFICANT CHANGE UP (ref 1.8–2.6)
MANUAL SMEAR VERIFICATION: SIGNIFICANT CHANGE UP
MCHC RBC-ENTMCNC: 20.1 PG — LOW (ref 27–34)
MCHC RBC-ENTMCNC: 29.1 GM/DL — LOW (ref 32–36)
MCV RBC AUTO: 69.1 FL — LOW (ref 80–100)
MICROCYTES BLD QL: SIGNIFICANT CHANGE UP
MONOCYTES # BLD AUTO: 0.34 K/UL — SIGNIFICANT CHANGE UP (ref 0–0.9)
MONOCYTES NFR BLD AUTO: 3.6 % — SIGNIFICANT CHANGE UP (ref 2–14)
NEUTROPHILS # BLD AUTO: 7.05 K/UL — SIGNIFICANT CHANGE UP (ref 1.8–7.4)
NEUTROPHILS NFR BLD AUTO: 73.9 % — SIGNIFICANT CHANGE UP (ref 43–77)
OVALOCYTES BLD QL SMEAR: SIGNIFICANT CHANGE UP
PHOSPHATE SERPL-MCNC: 3.6 MG/DL — SIGNIFICANT CHANGE UP (ref 2.4–4.7)
PLAT MORPH BLD: ABNORMAL
PLATELET # BLD AUTO: 319 K/UL — SIGNIFICANT CHANGE UP (ref 150–400)
POIKILOCYTOSIS BLD QL AUTO: SIGNIFICANT CHANGE UP
POLYCHROMASIA BLD QL SMEAR: SLIGHT — SIGNIFICANT CHANGE UP
POTASSIUM SERPL-MCNC: 4 MMOL/L — SIGNIFICANT CHANGE UP (ref 3.5–5.3)
POTASSIUM SERPL-SCNC: 4 MMOL/L — SIGNIFICANT CHANGE UP (ref 3.5–5.3)
RBC # BLD: 4.18 M/UL — LOW (ref 4.2–5.8)
RBC # FLD: 29.9 % — HIGH (ref 10.3–14.5)
RBC BLD AUTO: ABNORMAL
SODIUM SERPL-SCNC: 142 MMOL/L — SIGNIFICANT CHANGE UP (ref 135–145)
WBC # BLD: 9.54 K/UL — SIGNIFICANT CHANGE UP (ref 3.8–10.5)
WBC # FLD AUTO: 9.54 K/UL — SIGNIFICANT CHANGE UP (ref 3.8–10.5)

## 2022-04-03 RX ORDER — ENOXAPARIN SODIUM 100 MG/ML
40 INJECTION SUBCUTANEOUS EVERY 24 HOURS
Refills: 0 | Status: DISCONTINUED | OUTPATIENT
Start: 2022-04-03 | End: 2022-04-04

## 2022-04-03 RX ADMIN — Medication 200 MILLIGRAM(S): at 16:44

## 2022-04-03 RX ADMIN — Medication 3 MILLIGRAM(S): at 21:26

## 2022-04-03 RX ADMIN — Medication 200 MILLIGRAM(S): at 05:42

## 2022-04-03 RX ADMIN — ATORVASTATIN CALCIUM 20 MILLIGRAM(S): 80 TABLET, FILM COATED ORAL at 21:26

## 2022-04-03 RX ADMIN — AMLODIPINE BESYLATE 10 MILLIGRAM(S): 2.5 TABLET ORAL at 05:42

## 2022-04-03 RX ADMIN — ENOXAPARIN SODIUM 40 MILLIGRAM(S): 100 INJECTION SUBCUTANEOUS at 16:43

## 2022-04-03 RX ADMIN — HYDROMORPHONE HYDROCHLORIDE 0.5 MILLIGRAM(S): 2 INJECTION INTRAMUSCULAR; INTRAVENOUS; SUBCUTANEOUS at 17:10

## 2022-04-03 RX ADMIN — HYDROMORPHONE HYDROCHLORIDE 0.5 MILLIGRAM(S): 2 INJECTION INTRAMUSCULAR; INTRAVENOUS; SUBCUTANEOUS at 16:50

## 2022-04-03 RX ADMIN — Medication 1 TABLET(S): at 16:45

## 2022-04-03 RX ADMIN — Medication 1 MILLIGRAM(S): at 16:44

## 2022-04-03 RX ADMIN — Medication 200 MILLIGRAM(S): at 21:26

## 2022-04-03 NOTE — PROGRESS NOTE ADULT - ASSESSMENT
41M colonoscopy revealing  a large obstructing sigmoid mass. Unable to pass scope beyond mass. Mass is 65 cm from the anal verge,  Polyp (5 mm) in the proximal rectum. (Biopsy).  Awaiting pathology results. now patient is POD2 s/p robotic lap left colectomy patient tolerated procedure well . Toelrating diet, pending return f bowel fucntion    P:  FLD and advance when bowel function returns  pain control   monitor UOP   monitor BF   encourage OOB   IS 10xhr   DVT prophylaxis -Lovenox

## 2022-04-03 NOTE — CHART NOTE - NSCHARTNOTEFT_GEN_A_CORE
Source: Patient [ ]  Family [ ]   other [ ]   41M colonoscopy revealing  a large obstructing sigmoid mass. Unable to pass scope beyond mass. Mass is 65 cm from the anal verge,  Polyp (5 mm) in the proximal rectum. (Biopsy).  Awaiting pathology results. now patient is POD2 s/p robotic lap left colectomy patient tolerated procedure well . Toelrating diet, pending return f bowel function    Current Diet: Diet, Regular:   Consistent Carbohydrate {No Snacks} (CSTCHO)  Supplement Feeding Modality:  Oral  Ensure Enlive Cans or Servings Per Day:  1       Frequency:  Three Times a day (04-03-22 @ 10:45)      Patient reports [ ] nausea  [ ] vomiting [ ] diarrhea [ ] constipation  [ ]chewing problems [ ] swallowing issues  [ ] other:     PO intake:  < 50% [ ]   50-75%  [ ]   %  [ ]  other :    Source for PO intake [ ] Patient [ ] family [ ] chart [ ] staff [ ] other    Enteral /Parenteral Nutrition:     Current Weight:   3/23  60 kg  3/24  70.9 kg   4/1  70.9 kg                  % Weight Change     Pertinent Medications: MEDICATIONS  (STANDING):  amLODIPine   Tablet 10 milliGRAM(s) Oral daily  atorvastatin 20 milliGRAM(s) Oral at bedtime  dextrose 5%. 1000 milliLiter(s) (50 mL/Hr) IV Continuous <Continuous>  dextrose 5%. 1000 milliLiter(s) (100 mL/Hr) IV Continuous <Continuous>  dextrose 50% Injectable 25 Gram(s) IV Push once  dextrose 50% Injectable 12.5 Gram(s) IV Push once  dextrose 50% Injectable 25 Gram(s) IV Push once  enoxaparin Injectable 40 milliGRAM(s) SubCutaneous every 24 hours  folic acid 1 milliGRAM(s) Oral daily  glucagon  Injectable 1 milliGRAM(s) IntraMuscular once  insulin lispro (ADMELOG) corrective regimen sliding scale   SubCutaneous three times a day before meals  labetalol 200 milliGRAM(s) Oral three times a day  lactated ringers. 1000 milliLiter(s) (40 mL/Hr) IV Continuous <Continuous>  multivitamin 1 Tablet(s) Oral daily    MEDICATIONS  (PRN):  acetaminophen     Tablet .. 650 milliGRAM(s) Oral every 6 hours PRN Mild Pain (1 - 3)  dextrose Oral Gel 15 Gram(s) Oral once PRN Blood Glucose LESS THAN 70 milliGRAM(s)/deciliter  HYDROmorphone  Injectable 0.5 milliGRAM(s) IV Push every 4 hours PRN Severe Pain (7 - 10)  ketorolac   Injectable 15 milliGRAM(s) IV Push every 6 hours PRN Mild Pain (1 - 3)  melatonin 3 milliGRAM(s) Oral at bedtime PRN Insomnia  ondansetron Injectable 4 milliGRAM(s) IV Push once PRN Nausea and/or Vomiting  oxyCODONE    IR 5 milliGRAM(s) Oral every 4 hours PRN Severe Pain (7 - 10)    Pertinent Labs: CBC Full  -  ( 03 Apr 2022 06:23 )  WBC Count : 9.54 K/uL  RBC Count : 4.18 M/uL  Hemoglobin : 8.4 g/dL  Hematocrit : 28.9 %  Platelet Count - Automated : 319 K/uL  Mean Cell Volume : 69.1 fl  Mean Cell Hemoglobin : 20.1 pg  Mean Cell Hemoglobin Concentration : 29.1 gm/dL  Auto Neutrophil # : 7.05 K/uL  Auto Lymphocyte # : 1.46 K/uL  Auto Monocyte # : 0.34 K/uL  Auto Eosinophil # : 0.69 K/uL  Auto Basophil # : 0.00 K/uL  Auto Neutrophil % : 73.9 %  Auto Lymphocyte % : 15.3 %  Auto Monocyte % : 3.6 %  Auto Eosinophil % : 7.2 %  Auto Basophil % : 0.0 %    04-03 Na142 mmol/L Glu 119 mg/dL<H> K+ 4.0 mmol/L Cr  1.13 mg/dL BUN 14.5 mg/dL Phos 3.6 mg/dL Alb n/a   PAB n/a             Skin: abd sx incision     Nutrition focused physical exam conducted - found signs of malnutrition [ ]absent [x ]present    Subcutaneous fat loss: [ x] Orbital fat pads region, [x ]Buccal fat region, [ ]Triceps region,  [ ]Ribs region    Muscle wasting: [x ]Temples region, [x ]Clavicle region, [ ]Shoulder region, [ ]Scapula region, [ ]Interosseous region,  [ ]thigh region, [ ]Calf region    Estimated Needs:   [x ] no change since previous assessment  [ ] recalculated:     Current Nutrition Diagnosis:  Pt presents at risk secondary to Mod chronic, related to increased nutrient needs in setting of sigmoid mass, anemia, as evidenced by mild muscle depletion, 20#(11%) weight loss x 4 mo.  PO intake remains suboptimal , HBV protein foods encouraged.       Recommendations:   1) Continue glucerna,   2) Daily weight  3) RX MVI    Monitoring and Evaluation:   [ ] PO intake [ ] Tolerance to diet prescription [X] Weights  [X] Follow up per protocol [X] Labs:

## 2022-04-03 NOTE — PROGRESS NOTE ADULT - SUBJECTIVE AND OBJECTIVE BOX
INTERVAL HPI/OVERNIGHT EVENTS:    Patient evaluated at bedside. No acute distress. No acute events overnight. Tolerating FLD, no return of bowel function yet. Remains hemodynamically stable and afebrile. Naranjo removed yesterday and voiding.     MEDICATIONS  (STANDING):  amLODIPine   Tablet 10 milliGRAM(s) Oral daily  atorvastatin 20 milliGRAM(s) Oral at bedtime  dextrose 5%. 1000 milliLiter(s) (50 mL/Hr) IV Continuous <Continuous>  dextrose 5%. 1000 milliLiter(s) (100 mL/Hr) IV Continuous <Continuous>  dextrose 50% Injectable 25 Gram(s) IV Push once  dextrose 50% Injectable 12.5 Gram(s) IV Push once  dextrose 50% Injectable 25 Gram(s) IV Push once  enoxaparin Injectable 30 milliGRAM(s) SubCutaneous every 24 hours  folic acid 1 milliGRAM(s) Oral daily  glucagon  Injectable 1 milliGRAM(s) IntraMuscular once  insulin lispro (ADMELOG) corrective regimen sliding scale   SubCutaneous three times a day before meals  labetalol 200 milliGRAM(s) Oral three times a day  lactated ringers. 1000 milliLiter(s) (40 mL/Hr) IV Continuous <Continuous>  multivitamin 1 Tablet(s) Oral daily    MEDICATIONS  (PRN):  acetaminophen     Tablet .. 650 milliGRAM(s) Oral every 6 hours PRN Mild Pain (1 - 3)  dextrose Oral Gel 15 Gram(s) Oral once PRN Blood Glucose LESS THAN 70 milliGRAM(s)/deciliter  HYDROmorphone  Injectable 0.5 milliGRAM(s) IV Push every 4 hours PRN Severe Pain (7 - 10)  ketorolac   Injectable 15 milliGRAM(s) IV Push every 6 hours PRN Mild Pain (1 - 3)  melatonin 3 milliGRAM(s) Oral at bedtime PRN Insomnia  ondansetron Injectable 4 milliGRAM(s) IV Push once PRN Nausea and/or Vomiting  oxyCODONE    IR 5 milliGRAM(s) Oral every 4 hours PRN Severe Pain (7 - 10)      Vital Signs Last 24 Hrs  T(C): 36.9 (02 Apr 2022 18:03), Max: 36.9 (02 Apr 2022 18:03)  T(F): 98.4 (02 Apr 2022 18:03), Max: 98.4 (02 Apr 2022 18:03)  HR: 70 (02 Apr 2022 18:03) (70 - 85)  BP: 135/69 (02 Apr 2022 18:03) (135/69 - 165/86)  BP(mean): --  RR: 16 (02 Apr 2022 18:03) (16 - 18)  SpO2: 94% (02 Apr 2022 18:03) (94% - 95%)    Constitutional: NAD  HEENT: PERRL, EOMI  Neck: No JVD, FROM without pain  Respiratory: no accessory muscle use, respirations non-labored  GI: Soft, mildly distended, appropriatly tender around incisions, VALENTÍN dressing in place C/D/I   Neurological: A&O x 3; without gross deficit      I&O's Detail    01 Apr 2022 07:01  -  02 Apr 2022 07:00  --------------------------------------------------------  IN:  Total IN: 0 mL    OUT:    Indwelling Catheter - Urethral (mL): 2700 mL  Total OUT: 2700 mL    Total NET: -2700 mL      02 Apr 2022 07:01  -  03 Apr 2022 03:40  --------------------------------------------------------  IN:  Total IN: 0 mL    OUT:    Indwelling Catheter - Urethral (mL): 300 mL    Voided (mL): 250 mL  Total OUT: 550 mL    Total NET: -550 mL          LABS:                        9.4    13.24 )-----------( 269      ( 02 Apr 2022 07:34 )             32.4     04-02    139  |  103  |  13.8  ----------------------------<  158<H>  4.2   |  21.0<L>  |  1.17    Ca    9.4      02 Apr 2022 07:34  Phos  3.6     04-02  Mg     1.8     04-02            RADIOLOGY & ADDITIONAL STUDIES:

## 2022-04-04 ENCOUNTER — TRANSCRIPTION ENCOUNTER (OUTPATIENT)
Age: 42
End: 2022-04-04

## 2022-04-04 LAB
ANION GAP SERPL CALC-SCNC: 14 MMOL/L — SIGNIFICANT CHANGE UP (ref 5–17)
BASOPHILS # BLD AUTO: 0.03 K/UL — SIGNIFICANT CHANGE UP (ref 0–0.2)
BASOPHILS NFR BLD AUTO: 0.4 % — SIGNIFICANT CHANGE UP (ref 0–2)
BUN SERPL-MCNC: 12.7 MG/DL — SIGNIFICANT CHANGE UP (ref 8–20)
CALCIUM SERPL-MCNC: 9.2 MG/DL — SIGNIFICANT CHANGE UP (ref 8.6–10.2)
CHLORIDE SERPL-SCNC: 100 MMOL/L — SIGNIFICANT CHANGE UP (ref 98–107)
CO2 SERPL-SCNC: 23 MMOL/L — SIGNIFICANT CHANGE UP (ref 22–29)
CREAT SERPL-MCNC: 0.95 MG/DL — SIGNIFICANT CHANGE UP (ref 0.5–1.3)
EGFR: 102 ML/MIN/1.73M2 — SIGNIFICANT CHANGE UP
EOSINOPHIL # BLD AUTO: 0.31 K/UL — SIGNIFICANT CHANGE UP (ref 0–0.5)
EOSINOPHIL NFR BLD AUTO: 4 % — SIGNIFICANT CHANGE UP (ref 0–6)
GLUCOSE BLDC GLUCOMTR-MCNC: 132 MG/DL — HIGH (ref 70–99)
GLUCOSE BLDC GLUCOMTR-MCNC: 144 MG/DL — HIGH (ref 70–99)
GLUCOSE BLDC GLUCOMTR-MCNC: 199 MG/DL — HIGH (ref 70–99)
GLUCOSE BLDC GLUCOMTR-MCNC: 220 MG/DL — HIGH (ref 70–99)
GLUCOSE SERPL-MCNC: 130 MG/DL — HIGH (ref 70–99)
HCT VFR BLD CALC: 30.4 % — LOW (ref 39–50)
HGB BLD-MCNC: 8.8 G/DL — LOW (ref 13–17)
IMM GRANULOCYTES NFR BLD AUTO: 0.4 % — SIGNIFICANT CHANGE UP (ref 0–1.5)
LYMPHOCYTES # BLD AUTO: 0.95 K/UL — LOW (ref 1–3.3)
LYMPHOCYTES # BLD AUTO: 12.4 % — LOW (ref 13–44)
MAGNESIUM SERPL-MCNC: 1.8 MG/DL — SIGNIFICANT CHANGE UP (ref 1.6–2.6)
MCHC RBC-ENTMCNC: 19.9 PG — LOW (ref 27–34)
MCHC RBC-ENTMCNC: 28.9 GM/DL — LOW (ref 32–36)
MCV RBC AUTO: 68.6 FL — LOW (ref 80–100)
MONOCYTES # BLD AUTO: 0.6 K/UL — SIGNIFICANT CHANGE UP (ref 0–0.9)
MONOCYTES NFR BLD AUTO: 7.8 % — SIGNIFICANT CHANGE UP (ref 2–14)
NEUTROPHILS # BLD AUTO: 5.77 K/UL — SIGNIFICANT CHANGE UP (ref 1.8–7.4)
NEUTROPHILS NFR BLD AUTO: 75 % — SIGNIFICANT CHANGE UP (ref 43–77)
PHOSPHATE SERPL-MCNC: 3.8 MG/DL — SIGNIFICANT CHANGE UP (ref 2.4–4.7)
PLATELET # BLD AUTO: 328 K/UL — SIGNIFICANT CHANGE UP (ref 150–400)
POTASSIUM SERPL-MCNC: 3.4 MMOL/L — LOW (ref 3.5–5.3)
POTASSIUM SERPL-SCNC: 3.4 MMOL/L — LOW (ref 3.5–5.3)
RBC # BLD: 4.43 M/UL — SIGNIFICANT CHANGE UP (ref 4.2–5.8)
RBC # FLD: SIGNIFICANT CHANGE UP (ref 10.3–14.5)
SODIUM SERPL-SCNC: 136 MMOL/L — SIGNIFICANT CHANGE UP (ref 135–145)
WBC # BLD: 7.69 K/UL — SIGNIFICANT CHANGE UP (ref 3.8–10.5)
WBC # FLD AUTO: 7.69 K/UL — SIGNIFICANT CHANGE UP (ref 3.8–10.5)

## 2022-04-04 RX ORDER — OXYCODONE HYDROCHLORIDE 5 MG/1
1 TABLET ORAL
Qty: 16 | Refills: 0
Start: 2022-04-04 | End: 2022-04-07

## 2022-04-04 RX ORDER — AMLODIPINE BESYLATE 2.5 MG/1
1 TABLET ORAL
Qty: 0 | Refills: 0 | DISCHARGE
Start: 2022-04-04

## 2022-04-04 RX ORDER — HEPARIN SODIUM 5000 [USP'U]/ML
5000 INJECTION INTRAVENOUS; SUBCUTANEOUS EVERY 12 HOURS
Refills: 0 | Status: DISCONTINUED | OUTPATIENT
Start: 2022-04-04 | End: 2022-04-05

## 2022-04-04 RX ORDER — ATORVASTATIN CALCIUM 80 MG/1
1 TABLET, FILM COATED ORAL
Qty: 0 | Refills: 0 | DISCHARGE
Start: 2022-04-04

## 2022-04-04 RX ORDER — LABETALOL HCL 100 MG
1 TABLET ORAL
Qty: 0 | Refills: 0 | DISCHARGE
Start: 2022-04-04

## 2022-04-04 RX ORDER — FOLIC ACID 0.8 MG
1 TABLET ORAL
Qty: 0 | Refills: 0 | DISCHARGE
Start: 2022-04-04

## 2022-04-04 RX ORDER — ACETAMINOPHEN 500 MG
2 TABLET ORAL
Qty: 0 | Refills: 0 | DISCHARGE
Start: 2022-04-04

## 2022-04-04 RX ORDER — POTASSIUM CHLORIDE 20 MEQ
40 PACKET (EA) ORAL EVERY 4 HOURS
Refills: 0 | Status: COMPLETED | OUTPATIENT
Start: 2022-04-04 | End: 2022-04-04

## 2022-04-04 RX ADMIN — Medication 15 MILLIGRAM(S): at 18:03

## 2022-04-04 RX ADMIN — ATORVASTATIN CALCIUM 20 MILLIGRAM(S): 80 TABLET, FILM COATED ORAL at 21:15

## 2022-04-04 RX ADMIN — Medication 40 MILLIEQUIVALENT(S): at 10:28

## 2022-04-04 RX ADMIN — Medication 200 MILLIGRAM(S): at 05:42

## 2022-04-04 RX ADMIN — AMLODIPINE BESYLATE 10 MILLIGRAM(S): 2.5 TABLET ORAL at 05:41

## 2022-04-04 RX ADMIN — Medication 40 MILLIEQUIVALENT(S): at 15:40

## 2022-04-04 RX ADMIN — OXYCODONE HYDROCHLORIDE 5 MILLIGRAM(S): 5 TABLET ORAL at 05:41

## 2022-04-04 RX ADMIN — Medication 1 MILLIGRAM(S): at 10:28

## 2022-04-04 RX ADMIN — Medication 1: at 12:27

## 2022-04-04 RX ADMIN — Medication 200 MILLIGRAM(S): at 21:15

## 2022-04-04 RX ADMIN — HEPARIN SODIUM 5000 UNIT(S): 5000 INJECTION INTRAVENOUS; SUBCUTANEOUS at 18:01

## 2022-04-04 RX ADMIN — Medication 200 MILLIGRAM(S): at 15:40

## 2022-04-04 RX ADMIN — Medication 1 TABLET(S): at 10:29

## 2022-04-04 NOTE — PROGRESS NOTE ADULT - ASSESSMENT
41M with chronic anemia. Underwent  colonoscopy revealing  a large obstructing sigmoid mass. Unable to pass scope beyond mass. Mass was 65 cm from the anal verge,  Polyp (5 mm) in the proximal rectum. (Biopsy). Awaiting surgical pathology results. Now patient is POD3 s/p robotic lap left colectomy patient tolerated procedure well . Tolerating regular diet, experiencing borborygmics but no flatus or BM yet.      PLAN:  ·	OOB and IS-1000cc--increase goal daily.  ·	pain control   ·	monitor UOP   ·	monitor BF   ·	encourage OOB    ·	DVT prophylaxis -Lovenox   ·	Fu surgical specimen pathology.   ·	colonoscopy pathology :   1. 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 Rectosigmoid, polypectomy:  -Tubular adenoma  ·

## 2022-04-04 NOTE — DISCHARGE NOTE PROVIDER - HOSPITAL COURSE
40 y/o male with history of HTN/DM-2, sent in by PMD after o/p blood work showed severe anemia with a Hbg of 5.3. Patient was c/o feeling weak and dizzy at times when standing up. Denies any history of anemia or family history of blood disorders. Denies any BRBPR or dark stools, no vomiting blood or hematuria but did admit to unintentional 20 pound weight loss over the past 2 months. Pt initially admitted to medicine on 3/24 for anemia workup. On 3/25 [facility] called for consult due to CT reading of intussusception proximal to mid descending colon due to colon mass. GI consulted and colonoscopy preformed on 3/28 on revealed obstructing mass 65cm from the anal verge. Metastatic workup initiated which revealed CEA of 4.9 and a CT chest which was negative for distant mets. Patient transferred to the surgical service, received bowl prep and prophylactic abx on 3/31 and underwent L hemicolectomy on 4/1. Naranjo removed POD #1. Diet advanced and today pt reports he is passing gas. Pt tolerating PO, denies abdominal pain, nausea or vomiting. Pt will be discharged and can follow up with Dr. Perla from CRS within 1-2 weeks. Spoke with Dr. Ken from heme/ onc who will arrange for pt to be followed GI oncologist within 1-2 weeks d/c from hospital.

## 2022-04-04 NOTE — DISCHARGE NOTE PROVIDER - NSDCMRMEDTOKEN_GEN_ALL_CORE_FT
acetaminophen 325 mg oral tablet: 2 tab(s) orally every 6 hours, As needed, Mild Pain (1 - 3)   acetaminophen 325 mg oral tablet: 2 tab(s) orally every 6 hours, As needed, Mild Pain (1 - 3)  amLODIPine 10 mg oral tablet: 1 tab(s) orally once a day  atorvastatin 20 mg oral tablet: 1 tab(s) orally once a day (at bedtime)  folic acid 1 mg oral tablet: 1 tab(s) orally once a day  labetalol 200 mg oral tablet: 1 tab(s) orally 3 times a day  Multiple Vitamins oral tablet: 1 tab(s) orally once a day   acetaminophen 325 mg oral tablet: 2 tab(s) orally every 6 hours, As needed, Mild Pain (1 - 3)  amLODIPine 10 mg oral tablet: 1 tab(s) orally once a day  atorvastatin 20 mg oral tablet: 1 tab(s) orally once a day (at bedtime)  folic acid 1 mg oral tablet: 1 tab(s) orally once a day  labetalol 200 mg oral tablet: 1 tab(s) orally 3 times a day  Multiple Vitamins oral tablet: 1 tab(s) orally once a day  oxyCODONE 5 mg oral capsule: 1 cap(s) orally every 6 hours MDD:4

## 2022-04-04 NOTE — PROGRESS NOTE ADULT - SUBJECTIVE AND OBJECTIVE BOX
Acute Care Surgery/Trauma Surgery Progress Note:    No acute overnight events. Patient afebrile and VSS. Pain well controlled. Tolerating regular diet. Denies n/v/f/c/cp/sob.     Diet, Regular:   Consistent Carbohydrate No Snacks (CSTCHO)  Supplement Feeding Modality:  Oral  Ensure Enlive Cans or Servings Per Day:  1       Frequency:  Three Times a day (04-03-22 @ 10:45)      Scheduled Medications:   amLODIPine   Tablet 10 milliGRAM(s) Oral daily  atorvastatin 20 milliGRAM(s) Oral at bedtime  dextrose 5%. 1000 milliLiter(s) (100 mL/Hr) IV Continuous <Continuous>  dextrose 5%. 1000 milliLiter(s) (50 mL/Hr) IV Continuous <Continuous>  dextrose 50% Injectable 12.5 Gram(s) IV Push once  dextrose 50% Injectable 25 Gram(s) IV Push once  dextrose 50% Injectable 25 Gram(s) IV Push once  enoxaparin Injectable 40 milliGRAM(s) SubCutaneous every 24 hours  folic acid 1 milliGRAM(s) Oral daily  glucagon  Injectable 1 milliGRAM(s) IntraMuscular once  insulin lispro (ADMELOG) corrective regimen sliding scale   SubCutaneous three times a day before meals  labetalol 200 milliGRAM(s) Oral three times a day  lactated ringers. 1000 milliLiter(s) (40 mL/Hr) IV Continuous <Continuous>  multivitamin 1 Tablet(s) Oral daily    PRN Medications:  acetaminophen     Tablet .. 650 milliGRAM(s) Oral every 6 hours PRN Mild Pain (1 - 3)  dextrose Oral Gel 15 Gram(s) Oral once PRN Blood Glucose LESS THAN 70 milliGRAM(s)/deciliter  HYDROmorphone  Injectable 0.5 milliGRAM(s) IV Push every 4 hours PRN Severe Pain (7 - 10)  ketorolac   Injectable 15 milliGRAM(s) IV Push every 6 hours PRN Mild Pain (1 - 3)  melatonin 3 milliGRAM(s) Oral at bedtime PRN Insomnia  ondansetron Injectable 4 milliGRAM(s) IV Push once PRN Nausea and/or Vomiting  oxyCODONE    IR 5 milliGRAM(s) Oral every 4 hours PRN Severe Pain (7 - 10)      Objective:   T(F): 98.4 (04-03 @ 16:04), Max: 98.7 (04-03 @ 09:39)  HR: 75 (04-03 @ 16:04) (69 - 75)  BP: 157/79 (04-03 @ 16:04) (115/65 - 157/79)  BP(mean): --  ABP: --  ABP(mean): --  RR: 18 (04-03 @ 09:39) (18 - 18)  SpO2: 95% (04-03 @ 16:04) (95% - 100%)      Physical Exam:   GEN: patient resting comfortably in bed, in no acute distress  RESP: respirations are unlabored, no accessory muscle use, no conversational dyspnea  CVS: RRR  GI: Abdomen soft, non-tender, non-distended, no rebound tenderness / guarding. Surgical site intact.   Neuro: oriented x 3.     I&O's    04-02 @ 07:01  -  04-03 @ 07:00  --------------------------------------------------------  IN:  Total IN: 0 mL    OUT:    Indwelling Catheter - Urethral (mL): 300 mL    Voided (mL): 550 mL  Total OUT: 850 mL    Total NET: -850 mL      04-03 @ 07:01  -  04-04 @ 01:45  --------------------------------------------------------  IN:  Total IN: 0 mL    OUT:    Voided (mL): 200 mL  Total OUT: 200 mL    Total NET: -200 mL          LABS:                        8.4    9.54  )-----------( 319      ( 03 Apr 2022 06:23 )             28.9     04-03    142  |  105  |  14.5  ----------------------------<  119<H>  4.0   |  24.0  |  1.13    Ca    8.9      03 Apr 2022 06:23  Phos  3.6     04-03  Mg     2.2     04-03            MICROBIOLOGY:       PATHOLOGY:  Surgical Pathology Report:   ACCESSION No:  [id number]      Accession:                             [id number]    Collected Date/Time:                   3/28/2022 17:00 EDT  Received Date/Time:                    3/29/2022 09:49 EDT    Addendum Report - Auth (Verified)    Addendum    Mismatch Repair (MMR) Protein Testing (Based on CAP Reporting Template  December 2014)    Immunohistochemistry Testing (IHC) performed on tissue block  1A shows the  following results:      MLH1:  Expressed     DNA Mismatch Repair Protein    MSH2:  Expressed   DNA Mismatch Repair Protein    MSH6: Expressed     DNA Mismatch Repair Protein      PMS2:   Not Expressed    DNA Mismatch Repair Protein    Loss of nuclear expression of PMS2 only: high probability of  Chou    syndrome (sequencing and/or large deletion/duplication testing of  germline PMS2 may be indicated)      Interpretation of immunostains performed at [facility]    Technical report (#[id number]) from [employer], [place], [place] on file in  the   Department of Pathology  Verified by: Breana Marlow MD  (Electronic Signature)  Reported on: 04/01/22 16:52 EDT, [address] [address]  _________________________________________________________________    Surgical Pathology Report - Auth (Verified)    Specimen(s) Submitted  1  Descending colon mass biopsy  2  Rectal sigmoid polyp    Final Diagnosis  1. 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 Rectosigmoid, polypectomy:  -Tubular adenoma    Verified by: Breana Marlow MD  (Electronic Signature)  Reported on: 03/30/22 14:54 EDT, [address] [address]  _________________________________________________________________    Comment  MMR (IHC) pending  This case represents a current recent malignant diagnosis. This patient  will be registered in the [facility] Cancer Registry Database in accordance with  [facility] Law 2401. The cancer registrar and or [facility] may contact  you for clinical follow up.    Dr. OJHNSON Chaves notified by encrypted email      Clinical Information  Anemia    Gross Description  1. Received in formalin labeled   "ascending colon mass biopsy"   are  five,  tan-pink, soft tissue fragments ranging from 0.2 cm to 0.4 cm in greatest  dimension. Entirely in one cassette: 1A.    2. Received in formalin labeled   "rectosigmoid polyp"  are four, tan-  pink, soft tissue fragments ranging from 0.1 cm to 0.5 cm in greatest  dimension. Entirely in one cassette: 2A.    Jessica James 03/29/2022 10:02 AM    Disclaimer  In addition to other data that may appear on the specimen containers, all  labels have been inspected to confirm the presence of the patient's name  and date of birth.    Histological Processing Performed at [facility],  Department of Pathology, [address] [place] [address]. (03-28-22 @ 17:00)

## 2022-04-04 NOTE — DISCHARGE NOTE PROVIDER - NSFOLLOWUPCLINICS_GEN_ALL_ED_FT
[facility]  Hematology/Oncology  [address] [place] [address]  Phone: [phone]  Fax:      [facility]  Hematology/Oncology  [address] [place] [address]  Phone: [phone]  Fax:     [place] [address]  Phone: [phone]  Fax:

## 2022-04-04 NOTE — DISCHARGE NOTE PROVIDER - CARE PROVIDER_API CALL
Erika Perla)  Surgery  General  [address] [place] [id number]  Phone: [phone]  Fax: [phone]  Follow Up Time: 1 week

## 2022-04-04 NOTE — DISCHARGE NOTE PROVIDER - NSDCCPCAREPLAN_GEN_ALL_CORE_FT
PRINCIPAL DISCHARGE DIAGNOSIS  Diagnosis: Neoplasm of descending colon  Assessment and Plan of Treatment:   BATHING: Please do not submerge wound underwater. You may shower and/or sponge bathe.   ACTIVITY: No heavy lifting or straining. Otherwise, you may return to your usual level of physical activity. If you are taking narcotic pain medication (such as Percocet) DO NOT drive a car, operate machinery or make important decisions.  DIET: Return to your usual diet.  NOTIFY YOUR SURGEON IF: You have any bleeding that does not stop, any pus draining from your wound(s), any fever (over 100.4 F) or chills, persistent nausea/vomiting, persistent diarrhea, or if your pain is not controlled on your discharge pain medications.  FOLLOW-UP: Please follow up with your primary care physician, colorectal surgery as well as the [facility] cancer center within 1-2 weeks. Call for appointment upon discharge.         PRINCIPAL DISCHARGE DIAGNOSIS  Diagnosis: Neoplasm of descending colon  Assessment and Plan of Treatment: BATHING: Please do not submerge wound underwater. You may shower and/or sponge bathe.   ACTIVITY: No heavy lifting or straining. Otherwise, you may return to your usual level of physical activity. If you are taking narcotic pain medication (such as Percocet) DO NOT drive a car, operate machinery or make important decisions.  DIET: Return to your usual diet.  NOTIFY YOUR SURGEON IF: You have any bleeding that does not stop, any pus draining from your wound(s), any fever (over 100.4 F) or chills, persistent nausea/vomiting, persistent diarrhea, or if your pain is not controlled on your discharge pain medications.  FOLLOW-UP: Please follow up with your primary care physician, colorectal surgery as well as the [facility] cancer center within 1-2 weeks. Call for appointment upon discharge.  Follow up with your primary medical doctor for your hypertension- you have been prescribed 10mg Amlodipine daily  and 200mg Labetalol three times a day

## 2022-04-05 ENCOUNTER — TRANSCRIPTION ENCOUNTER (OUTPATIENT)
Age: 42
End: 2022-04-05

## 2022-04-05 VITALS
TEMPERATURE: 98 F | HEART RATE: 83 BPM | OXYGEN SATURATION: 99 % | DIASTOLIC BLOOD PRESSURE: 89 MMHG | SYSTOLIC BLOOD PRESSURE: 143 MMHG

## 2022-04-05 LAB
GLUCOSE BLDC GLUCOMTR-MCNC: 129 MG/DL — HIGH (ref 70–99)
SARS-COV-2 RNA SPEC QL NAA+PROBE: SIGNIFICANT CHANGE UP

## 2022-04-05 PROCEDURE — 83550 IRON BINDING TEST: CPT

## 2022-04-05 PROCEDURE — 86901 BLOOD TYPING SEROLOGIC RH(D): CPT

## 2022-04-05 PROCEDURE — 86900 BLOOD TYPING SEROLOGIC ABO: CPT

## 2022-04-05 PROCEDURE — 99285 EMERGENCY DEPT VISIT HI MDM: CPT

## 2022-04-05 PROCEDURE — 93005 ELECTROCARDIOGRAM TRACING: CPT

## 2022-04-05 PROCEDURE — 74177 CT ABD & PELVIS W/CONTRAST: CPT

## 2022-04-05 PROCEDURE — 82728 ASSAY OF FERRITIN: CPT

## 2022-04-05 PROCEDURE — C9399: CPT

## 2022-04-05 PROCEDURE — 88305 TISSUE EXAM BY PATHOLOGIST: CPT

## 2022-04-05 PROCEDURE — 71260 CT THORAX DX C+: CPT

## 2022-04-05 PROCEDURE — C1889: CPT

## 2022-04-05 PROCEDURE — 83735 ASSAY OF MAGNESIUM: CPT

## 2022-04-05 PROCEDURE — 86850 RBC ANTIBODY SCREEN: CPT

## 2022-04-05 PROCEDURE — 84100 ASSAY OF PHOSPHORUS: CPT

## 2022-04-05 PROCEDURE — 88309 TISSUE EXAM BY PATHOLOGIST: CPT

## 2022-04-05 PROCEDURE — 85025 COMPLETE CBC W/AUTO DIFF WBC: CPT

## 2022-04-05 PROCEDURE — 86923 COMPATIBILITY TEST ELECTRIC: CPT

## 2022-04-05 PROCEDURE — 80048 BASIC METABOLIC PNL TOTAL CA: CPT

## 2022-04-05 PROCEDURE — 88341 IMHCHEM/IMCYTCHM EA ADD ANTB: CPT

## 2022-04-05 PROCEDURE — 86703 HIV-1/HIV-2 1 RESULT ANTBDY: CPT

## 2022-04-05 PROCEDURE — 36415 COLL VENOUS BLD VENIPUNCTURE: CPT

## 2022-04-05 PROCEDURE — 84466 ASSAY OF TRANSFERRIN: CPT

## 2022-04-05 PROCEDURE — 80053 COMPREHEN METABOLIC PANEL: CPT

## 2022-04-05 PROCEDURE — 82272 OCCULT BLD FECES 1-3 TESTS: CPT

## 2022-04-05 PROCEDURE — P9040: CPT

## 2022-04-05 PROCEDURE — 83615 LACTATE (LD) (LDH) ENZYME: CPT

## 2022-04-05 PROCEDURE — 85610 PROTHROMBIN TIME: CPT

## 2022-04-05 PROCEDURE — 82378 CARCINOEMBRYONIC ANTIGEN: CPT

## 2022-04-05 PROCEDURE — 85730 THROMBOPLASTIN TIME PARTIAL: CPT

## 2022-04-05 PROCEDURE — 0225U NFCT DS DNA&RNA 21 SARSCOV2: CPT

## 2022-04-05 PROCEDURE — 85027 COMPLETE CBC AUTOMATED: CPT

## 2022-04-05 PROCEDURE — 88342 IMHCHEM/IMCYTCHM 1ST ANTB: CPT

## 2022-04-05 PROCEDURE — 83540 ASSAY OF IRON: CPT

## 2022-04-05 PROCEDURE — 76775 US EXAM ABDO BACK WALL LIM: CPT

## 2022-04-05 PROCEDURE — 85045 AUTOMATED RETICULOCYTE COUNT: CPT

## 2022-04-05 PROCEDURE — 82962 GLUCOSE BLOOD TEST: CPT

## 2022-04-05 PROCEDURE — 83036 HEMOGLOBIN GLYCOSYLATED A1C: CPT

## 2022-04-05 PROCEDURE — S2900: CPT

## 2022-04-05 PROCEDURE — U0003: CPT

## 2022-04-05 PROCEDURE — U0005: CPT

## 2022-04-05 PROCEDURE — 36430 TRANSFUSION BLD/BLD COMPNT: CPT

## 2022-04-05 PROCEDURE — 83010 ASSAY OF HAPTOGLOBIN QUANT: CPT

## 2022-04-05 RX ORDER — AMLODIPINE BESYLATE 2.5 MG/1
1 TABLET ORAL
Qty: 14 | Refills: 0
Start: 2022-04-05 | End: 2022-04-18

## 2022-04-05 RX ORDER — LABETALOL HCL 100 MG
1 TABLET ORAL
Qty: 42 | Refills: 0
Start: 2022-04-05 | End: 2022-04-18

## 2022-04-05 RX ADMIN — HEPARIN SODIUM 5000 UNIT(S): 5000 INJECTION INTRAVENOUS; SUBCUTANEOUS at 05:07

## 2022-04-05 RX ADMIN — Medication 200 MILLIGRAM(S): at 05:07

## 2022-04-05 RX ADMIN — AMLODIPINE BESYLATE 10 MILLIGRAM(S): 2.5 TABLET ORAL at 05:07

## 2022-04-05 RX ADMIN — Medication 15 MILLIGRAM(S): at 05:07

## 2022-04-05 RX ADMIN — Medication 15 MILLIGRAM(S): at 06:26

## 2022-04-05 NOTE — PROGRESS NOTE ADULT - ATTENDING COMMENTS
Patient doing well.  Pain controlled, no N/V.  Tolerated full liquids.  Ambulated several times yesterday.  Voided after Naranjo removal.  On exam abdomen soft, nondistended, incisions C/D/I mostly though midline with small amount serosanguinous drainage by umbilicus.    -- Advance to low residue diet, DC IV fluids  -- Awaiting return of bowel function  -- Encourage ambulation, IS  -- DVT prophylaxis - adjust to 40mg daily Lovenox dosing  -- Multimodal pain control
Patient seen and examined at bedside. Large obstructing mass at 65cm from the anal verge causing intussusception of the left colon. Patient is not clinically obstructed is tolerating liquid diet, denies pain, and reports bowel function, flatus and stool.    Continue full liquid diet as tolerated  Can reduce IV fluids  Medical optimization for the OR  Bowel prep, pre-operative labs including T&S, 3/31  I discussed the surgical procedure, risks, benefits, and potential complications at bedside with the patient. The patient understands that the procedure will be attempted in a minimally invasive manner with the possibility of converting to an open operation, and the possibility of stoma creation. We discussed risks/complications including morbidity and potential mortality, injury to the kidney, ureter, bladder, small bowel and near by structures. Patient understood our discussion and would like to proceed. I will revisit this conversation at time of surgical consent also.  Continue supportive care, plan for operative intervention 4/1 - Robot assisted left colectomy
POD#1 s/p robotic L colectomy with anastomosis.  Patient doing well, complaining of some incisional pain.  Denies N/V.  No flatus or BM yet.  Pain controlled.  Exam shows nondistended abdomen, soft, appropriately tender near incisions.  Labs with expcted reactive leukocytosis, otherwise fairly unremarkable electrolytes.  Hgb stable at 9.4.    -- On clear liquids, advance to full liquids later today  -- Multimodal pain control regimen  -- Naranjo out, awaiting void  -- Encourage ambulation, IS  -- DVT prophylaxis
Patient seen and examined this morning pre-operatively. No acute issues overnight. Patient received and tolerated his bowel prep without any issues. Supportive care post-operatively.
Patient tolerating diet, passing flatus and BM.  No N/V, pain controlled, ambulating, voiding.  On exam abdomen is soft, nondistended, appropriately tender near incision, no rebound or guarding.  Incision C/D/I, dressing placed two days ago removed.  Afebrile and vitals WNL.    Plan for discharge today  Low residue diet  Local wound care - can place gauze or bandage if any leakage, advised regarding concerning changes such as erythema, pain, purulent discharge  Reviewed discharge instructions regarding diet, activity, wound care, pain management with patient.  All questions answered.  Will need follow-up with Dr. Erika Perla in 2 weeks for postoperative check.
Patient seen and examined at bedside, patient is doing well s/p Robot assisted left colectomy. He is tolerating a diet and began passing flatus this morning. Patient reports that his pain is ~6/10 and improving. Abdomen is soft, non-distended, appropriately tender. Incisions clean dry and intact.    Ensure adequate pain control  Continue regular diet  Now having bowel function  DVT ppx  OOB with ambulation  Pathology pending  Patient will have outpatient follow up with oncology  Plan for discharge

## 2022-04-05 NOTE — PROGRESS NOTE ADULT - NUTRITIONAL ASSESSMENT
This patient has been assessed with a concern for Malnutrition and has been determined to have a diagnosis/diagnoses of Moderate protein-calorie malnutrition.    This patient is being managed with:   Diet NPO after Midnight-     NPO Start Date: 31-Mar-2022   NPO Start Time: 23:59  Entered: Mar 31 2022 12:58PM    Diet Clear Liquid-  Entered: Mar 31 2022 12:57PM    
This patient has been assessed with a concern for Malnutrition and has been determined to have a diagnosis/diagnoses of Moderate protein-calorie malnutrition.    This patient is being managed with:   Diet Regular-  Consistent Carbohydrate {No Snacks} (CSTCHO)  Supplement Feeding Modality:  Oral  Ensure Enlive Cans or Servings Per Day:  1       Frequency:  Three Times a day  Entered: Apr  3 2022 10:45AM    
This patient has been assessed with a concern for Malnutrition and has been determined to have a diagnosis/diagnoses of Moderate protein-calorie malnutrition.    This patient is being managed with:   Diet Regular-  Consistent Carbohydrate {No Snacks} (CSTCHO)  Supplement Feeding Modality:  Oral  Ensure Enlive Cans or Servings Per Day:  1       Frequency:  Three Times a day  Entered: Apr  3 2022 10:45AM

## 2022-04-05 NOTE — DISCHARGE NOTE NURSING/CASE MANAGEMENT/SOCIAL WORK - NSDCVIVACCINE_GEN_ALL_CORE_FT
Tdap; 11-Feb-2020 11:23; Yesenia Cobian (RN); [employer]; [id number] (Exp. Date: 11-Mar-2022); IntraMuscular; Deltoid Left.; 0.5 milliLiter(s); VIS (VIS Published: 09-May-2013, VIS Presented: 11-Feb-2020);

## 2022-04-05 NOTE — PROGRESS NOTE ADULT - PROVIDER SPECIALTY LIST ADULT
Gastroenterology
Hospitalist
Anesthesia
Colorectal Surgery
Surgery
Surgery
Colorectal Surgery
Hospitalist
Hospitalist
Surgery
Colorectal Surgery
Hospitalist
Gastroenterology
Hospitalist
Colorectal Surgery
Hospitalist

## 2022-04-05 NOTE — DISCHARGE NOTE NURSING/CASE MANAGEMENT/SOCIAL WORK - PATIENT PORTAL LINK FT
You can access the FollowMyHealth Patient Portal offered by [facility] by registering at the following website: [email or web address]. By joining [facility]’s FollowMyHealth portal, you will also be able to view your health information using other applications (apps) compatible with our system.

## 2022-04-05 NOTE — PROGRESS NOTE ADULT - REASON FOR ADMISSION
Symptomatic anemia
severe anemia
Symptomatic anemia

## 2022-04-05 NOTE — PROGRESS NOTE ADULT - ASSESSMENT
41M s/p robotic lap left colectomy now POD#4.      Plan:  Ensure adequate pain control  Continue regular diet  Now having bowel function  DVT ppx  OOB with ambulation  Pathology pending  Patient will have outpatient follow up with oncology  Plan for discharge .

## 2022-04-05 NOTE — PROGRESS NOTE ADULT - SUBJECTIVE AND OBJECTIVE BOX
Subjective:    No acute overnight events. Patient afebrile and VSS. Pain well controlled. Tolerating regular diet. Denies n/v/f/c/cp/sob.     MEDICATIONS  (STANDING):  amLODIPine   Tablet 10 milliGRAM(s) Oral daily  atorvastatin 20 milliGRAM(s) Oral at bedtime  dextrose 5%. 1000 milliLiter(s) (100 mL/Hr) IV Continuous <Continuous>  dextrose 5%. 1000 milliLiter(s) (50 mL/Hr) IV Continuous <Continuous>  dextrose 50% Injectable 25 Gram(s) IV Push once  dextrose 50% Injectable 12.5 Gram(s) IV Push once  dextrose 50% Injectable 25 Gram(s) IV Push once  folic acid 1 milliGRAM(s) Oral daily  glucagon  Injectable 1 milliGRAM(s) IntraMuscular once  heparin   Injectable 5000 Unit(s) SubCutaneous every 12 hours  insulin lispro (ADMELOG) corrective regimen sliding scale   SubCutaneous three times a day before meals  labetalol 200 milliGRAM(s) Oral three times a day  multivitamin 1 Tablet(s) Oral daily    MEDICATIONS  (PRN):  acetaminophen     Tablet .. 650 milliGRAM(s) Oral every 6 hours PRN Mild Pain (1 - 3)  dextrose Oral Gel 15 Gram(s) Oral once PRN Blood Glucose LESS THAN 70 milliGRAM(s)/deciliter  ketorolac   Injectable 15 milliGRAM(s) IV Push every 6 hours PRN Mild Pain (1 - 3)  melatonin 3 milliGRAM(s) Oral at bedtime PRN Insomnia  ondansetron Injectable 4 milliGRAM(s) IV Push once PRN Nausea and/or Vomiting  oxyCODONE    IR 5 milliGRAM(s) Oral every 4 hours PRN Severe Pain (7 - 10)      Vital Signs Last 24 Hrs  T(C): 36.9 (04 Apr 2022 21:11), Max: 36.9 (04 Apr 2022 05:36)  T(F): 98.4 (04 Apr 2022 21:11), Max: 98.5 (04 Apr 2022 05:36)  HR: 73 (04 Apr 2022 21:11) (65 - 74)  BP: 154/79 (04 Apr 2022 21:11) (138/71 - 155/80)  BP(mean): --  RR: 18 (04 Apr 2022 21:11) (16 - 18)  SpO2: 97% (04 Apr 2022 21:11) (95% - 98%)      04-03  -  04-04  --------------------------------------------------------  IN:  Total IN: 0 mL    OUT:    Voided (mL): 500 mL  Total OUT: 500 mL    Total NET: -500 mL          Physical Exam:   GEN: patient resting comfortably in bed, in no acute distress  RESP: respirations are unlabored, no accessory muscle use, no conversational dyspnea  CVS: RRR  GI: Abdomen soft, non-tender, non-distended, no rebound tenderness / guarding. Surgical site intact.   Neuro: oriented x 3.       LABS:                        8.8    7.69  )-----------( 328      ( 04 Apr 2022 06:57 )             30.4     04-04    136  |  100  |  12.7  ----------------------------<  130<H>  3.4<L>   |  23.0  |  0.95    Ca    9.2      04 Apr 2022 06:57  Phos  3.8     04-04  Mg     1.8     04-04

## 2022-04-07 ENCOUNTER — NON-APPOINTMENT (OUTPATIENT)
Age: 42
End: 2022-04-07

## 2022-04-15 ENCOUNTER — APPOINTMENT (OUTPATIENT)
Dept: COLORECTAL SURGERY | Facility: CLINIC | Age: 42
End: 2022-04-15

## 2022-04-19 LAB — SURGICAL PATHOLOGY STUDY: SIGNIFICANT CHANGE UP

## 2022-04-29 DIAGNOSIS — C18.6 MALIGNANT NEOPLASM OF DESCENDING COLON: ICD-10-CM

## 2022-05-05 ENCOUNTER — APPOINTMENT (OUTPATIENT)
Dept: COLORECTAL SURGERY | Facility: CLINIC | Age: 42
End: 2022-05-05
Payer: COMMERCIAL

## 2022-05-05 VITALS
HEIGHT: 70 IN | OXYGEN SATURATION: 100 % | RESPIRATION RATE: 15 BRPM | TEMPERATURE: 97.6 F | BODY MASS INDEX: 24.34 KG/M2 | SYSTOLIC BLOOD PRESSURE: 185 MMHG | HEART RATE: 73 BPM | DIASTOLIC BLOOD PRESSURE: 100 MMHG | WEIGHT: 170 LBS

## 2022-05-05 DIAGNOSIS — C18.6 MALIGNANT NEOPLASM OF DESCENDING COLON: ICD-10-CM

## 2022-05-05 DIAGNOSIS — Z09 ENCOUNTER FOR FOLLOW-UP EXAMINATION AFTER COMPLETED TREATMENT FOR CONDITIONS OTHER THAN MALIGNANT NEOPLASM: ICD-10-CM

## 2022-05-05 PROCEDURE — 99024 POSTOP FOLLOW-UP VISIT: CPT

## 2022-05-05 NOTE — REASON FOR VISIT
Called pt to remind her of upcoming RD appt 3/30. Pt would like to reschedule at a later date. RD will call back to reschedule.    [Post Op: _________] : a [unfilled] post op visit [FreeTextEntry1] : Left colon cancer

## 2022-05-05 NOTE — HISTORY OF PRESENT ILLNESS
[FreeTextEntry1] : 41 year old male here for post-operative follow up s/p Robot assisted left colectomy for obstructing colonic adenocarcinoma. 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 Patient's biopsy prior to surgery was loss of PMS2 expression. I have called to establish follow up with Oncology for recommendations and for surveillance. He has since seen Dr. Flanagan at the [facility] and has a follow up appointment on July 21 with Dr. Flanagan. In addition to oncology follow up patient will be scheduled to have a completion colonoscopy to evaluate the remainder of his colon as this was not possible pre-operatively.\par \par Patient is doing well, he is tolerating a regular diet, having bowel function without issues, denies N/V/F/C, and his wounds are healing well.

## 2022-05-05 NOTE — ASSESSMENT
[FreeTextEntry1] : 41 year old male here for post-operative follow up s/p Robot assisted left colectomy for obstructing colonic adenocarcinoma. Patient is doing well, he is tolerating a regular diet, having bowel function without issues, denies N/V/F/C, and his wounds are healing well.\par \par Patient has established care with Oncology and will also follow up with me for surveillance\par Plan for completion colonoscopy\par Follow up in 3 months

## 2022-05-05 NOTE — PHYSICAL EXAM
[Tender] : nontender [JVD] : no jugular venous distention  [Alert] : alert [Oriented to Person] : oriented to person [Oriented to Place] : oriented to place [Oriented to Time] : oriented to time [Calm] : calm [de-identified] : Soft, non-tender, non-distended, surgical incisions healing well [de-identified] : NAD, well appearing adult male [de-identified] : Nonlabored [de-identified] : Normal rate

## 2022-08-17 ENCOUNTER — APPOINTMENT (OUTPATIENT)
Dept: COLORECTAL SURGERY | Facility: AMBULATORY MEDICAL SERVICES | Age: 42
End: 2022-08-17

## 2022-08-17 ENCOUNTER — EMERGENCY (EMERGENCY)
Facility: HOSPITAL | Age: 42
LOS: 0 days | Discharge: HOME | End: 2022-08-17
Attending: STUDENT IN AN ORGANIZED HEALTH CARE EDUCATION/TRAINING PROGRAM
Payer: COMMERCIAL

## 2022-08-17 ENCOUNTER — APPOINTMENT (OUTPATIENT)
Dept: COLORECTAL SURGERY | Facility: CLINIC | Age: 42
End: 2022-08-17

## 2022-08-17 VITALS
HEIGHT: 68 IN | RESPIRATION RATE: 19 BRPM | SYSTOLIC BLOOD PRESSURE: 172 MMHG | DIASTOLIC BLOOD PRESSURE: 125 MMHG | WEIGHT: 190.04 LBS | HEART RATE: 67 BPM | TEMPERATURE: 98 F | OXYGEN SATURATION: 99 %

## 2022-08-17 VITALS
SYSTOLIC BLOOD PRESSURE: 153 MMHG | HEART RATE: 73 BPM | TEMPERATURE: 98 F | OXYGEN SATURATION: 97 % | DIASTOLIC BLOOD PRESSURE: 110 MMHG | RESPIRATION RATE: 12 BRPM

## 2022-08-17 DIAGNOSIS — N40.0 BENIGN PROSTATIC HYPERPLASIA WITHOUT LOWER URINARY TRACT SYMPTOMS: ICD-10-CM

## 2022-08-17 DIAGNOSIS — Z20.822 CONTACT WITH AND (SUSPECTED) EXPOSURE TO COVID-19: ICD-10-CM
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
## 2022-08-17 DIAGNOSIS — E78.5 HYPERLIPIDEMIA, UNSPECIFIED: ICD-10-CM

## 2022-08-17 DIAGNOSIS — E11.9 TYPE 2 DIABETES MELLITUS WITHOUT COMPLICATIONS: ICD-10-CM

## 2022-08-17 DIAGNOSIS — I10 ESSENTIAL (PRIMARY) HYPERTENSION: ICD-10-CM

## 2022-08-17 DIAGNOSIS — T46.5X6A UNDERDOSING OF OTHER ANTIHYPERTENSIVE DRUGS, INITIAL ENCOUNTER: ICD-10-CM

## 2022-08-17 PROCEDURE — 93010 ELECTROCARDIOGRAM REPORT: CPT

## 2022-08-17 PROCEDURE — 99284 EMERGENCY DEPT VISIT MOD MDM: CPT

## 2022-08-17 PROCEDURE — 93005 ELECTROCARDIOGRAM TRACING: CPT

## 2022-08-17 PROCEDURE — 99283 EMERGENCY DEPT VISIT LOW MDM: CPT

## 2022-08-17 PROCEDURE — U0005: CPT

## 2022-08-17 PROCEDURE — U0003: CPT

## 2022-08-17 RX ORDER — LABETALOL HCL 100 MG
200 TABLET ORAL ONCE
Refills: 0 | Status: COMPLETED | OUTPATIENT
Start: 2022-08-17 | End: 2022-08-17

## 2022-08-17 RX ORDER — NIRMATRELVIR AND RITONAVIR 150-100 MG
150 KIT ORAL
Qty: 10 | Refills: 0
Start: 2022-08-17

## 2022-08-17 RX ORDER — AMLODIPINE BESYLATE 2.5 MG/1
5 TABLET ORAL ONCE
Refills: 0 | Status: COMPLETED | OUTPATIENT
Start: 2022-08-17 | End: 2022-08-17

## 2022-08-17 RX ORDER — LABETALOL HCL 100 MG
10 TABLET ORAL ONCE
Refills: 0 | Status: DISCONTINUED | OUTPATIENT
Start: 2022-08-17 | End: 2022-08-17

## 2022-08-17 RX ADMIN — AMLODIPINE BESYLATE 5 MILLIGRAM(S): 2.5 TABLET ORAL at 13:14

## 2022-08-17 RX ADMIN — Medication 200 MILLIGRAM(S): at 11:17

## 2022-08-17 NOTE — ED PROVIDER NOTE - PHYSICAL EXAMINATION
Vitals  T(F): 97.8 (08-17-22 @ 11:59), Max: 98.2 (08-17-22 @ 09:16)  HR: 67 (08-17-22 @ 11:59) (67 - 67)  BP: 151/110 (08-17-22 @ 12:00) (151/110 - 172/125)  RR: 18 (08-17-22 @ 11:59) (18 - 19)  SpO2: 99% (08-17-22 @ 11:59) (99% - 99%)    PHYSICAL EXAM   Gen: NAD, comfortable, AA&Ox4  HEENT: head atrumatic and normocephalic, PERRLA, EOMI,   CVS: +s1, s2, regular rate and rhythm, no murmurs, rubs or gallops  RESP: normal respiratory effort, clear to auscultation b/l, no wheezes/crackles/rhonchi  GI: soft, non-tender, non-distended, +bowel sound  Extremities: no pitting edema  Skin: no wounds   Neuro: answering questions appropriately, face symmetric, sensation equal bilaterally in face, tongue midline, no dysarthria, 5/5 strength in upper and lower extremities bilaterally, sensation intact in upper and lower extremities bilaterally

## 2022-08-17 NOTE — ED PROVIDER NOTE - NSFOLLOWUPINSTRUCTIONS_ED_ALL_ED_FT
- follow up with PCP  - take home BP meds as prescribed.        Hypertension is considered chronic when it continues for 3 months or longer. Hypertension that continues causes your heart to work much harder than normal, which may lead to heart damage. Even if you have hypertension for years, lifestyle changes, medicines, or both may help lower your blood pressure.    DISCHARGE INSTRUCTIONS:    Call your local emergency number (911 in the [place]) or have someone call if:   •You have chest pain.      •You have any of the following signs of a heart attack: ?Squeezing, pressure, or pain in your chest      ?You may also have any of the following: ?Discomfort or pain in your back, neck, jaw, stomach, or arm      ?Shortness of breath      ?Nausea or vomiting      ?Lightheadedness or a sudden cold sweat        •You become confused or have difficulty speaking.      •You suddenly feel lightheaded or have trouble breathing.      Seek care immediately if:   •You have a severe headache or vision loss.      •You have weakness in an arm or leg.      Call your doctor or cardiologist if:   •You feel faint, dizzy, confused, or drowsy.      •You have been taking your blood pressure medicine but your pressure is higher than your provider says it should be.      •You have questions or concerns about your condition or care.      Medicines: You may need any of the following:  •Antihypertensives may be used to help lower your blood pressure. Several kinds of medicines are available. Your healthcare provider may change the medicine or medicines you currently take. This may be needed if your blood pressure is often high when you check it at home or you are having other problems with blood pressure control.      •Diuretics help decrease extra fluid that collects in your body. This will help lower your BP. You may urinate more often while you take this medicine.      •Cholesterol medicine helps lower your cholesterol level. A low cholesterol level helps prevent heart disease and makes it easier to control your blood pressure.      •Take your medicine as directed. Contact your healthcare provider if you think your medicine is not helping or if you have side effects. Tell him or her if you are allergic to any medicine. Keep a list of the medicines, vitamins, and herbs you take. Include the amounts, and when and why you take them. Bring the list or the pill bottles to follow-up visits. Carry your medicine list with you in case of an emergency.      Stages of hypertension: Your healthcare provider will give you a blood pressure goal based on your age, health, and risk for cardiovascular disease. The following are general guidelines on the stages of hypertension:  •Normal blood pressure is 119/79 or lower. Your healthcare provider may only check your blood pressure each year if it stays at a normal level.      •Elevated blood pressure is 120/79 to 129/79. This is sometimes called prehypertension. Your healthcare provider may suggest lifestyle changes to help lower your blood pressure to a normal level. He or she may then check it again in 3 to 6 months.      •Stage 1 hypertension is 130/80 to 139/89. Your provider may recommend lifestyle changes, medication, and checks every 3 to 6 months until your blood pressure is controlled.      •Stage 2 hypertension is 140/90 or higher. Your provider will recommend lifestyle changes and have you take 2 kinds of hypertension medicines. You will also need to have your blood pressure checked monthly until it is controlled.    Blood Pressure Readings         Manage chronic hypertension:   •Check your blood pressure at home. Do not smoke, have caffeine, or exercise for at least 30 minutes before you check your blood pressure. Sit and rest for 5 minutes before you check your blood pressure. Extend your arm and support it on a flat surface. Your arm should be at the same level as your heart. Follow the directions that came with your blood pressure monitor. Check your blood pressure 2 times, 1 minute apart, before you take your medicine in the morning. Also check your blood pressure before your evening meal. Keep a record of your readings and bring it to your follow-up visits. Your healthcare provider may use the readings to make changes to your treatment plan.  How to take a Blood Pressure           •Manage any other health conditions you have. Health conditions such as diabetes can increase your risk for hypertension. Follow your healthcare provider's instructions and take all your medicines as directed. Talk to your healthcare provider about any new health conditions you have recently developed.      •Ask about all medicines. Certain medicines can increase your blood pressure. Examples include oral birth control pills, decongestants, herbal supplements, and NSAIDs, such as ibuprofen. Your healthcare provider can tell you which medicines are safe for you to take. This includes prescription and over-the-counter medicines.      Lifestyle changes you can make to lower your blood pressure: Your healthcare provider may want you to make more lifestyle changes if you are having trouble controlling your blood pressure. This may feel difficult over time, especially if you think you are making good changes but your pressure is still high. It might help to focus on one new change at a time. For example, try to add 1 more day of exercise, or exercise for an extra 10 minutes on 2 days. Small changes can make a big difference. Your provider can also refer you to specialists such as a dietitian who can help you make small changes. Your family members may be included in helping you learn to create lifestyle changes, such as the following:  Ways to Lower Your Blood Pressure       •Limit sodium (salt) as directed. Too much sodium can affect your fluid balance. Check labels to find low-sodium or no-salt-added foods. Some low-sodium foods use potassium salts for flavor. Too much potassium can also cause health problems. Your provider will tell you how much sodium and potassium are safe for you to have in a day. He or she may recommend that you limit sodium to 2,300 mg a day.             •Follow the meal plan recommended by your provider. A dietitian or your provider can give you more information on low-sodium plans or the DASH (Dietary Approaches to Stop Hypertension) eating plan. The DASH plan is low in sodium, processed sugar, unhealthy fats, and total fat. It is high in potassium, calcium, and fiber. These can be found in vegetables, fruit, and whole-grain foods.             •Be physically active throughout the day. Physical activity, such as exercise, can help control your blood pressure and your weight. Be physically active for at least 30 minutes per day, on most days of the week. Include aerobic activity, such as walking or riding a bicycle. Also include strength training at least 2 times each week. Your provider can help you create a physical activity plan.  [employer] Walking for Exercise       Strength Training for Adults           •Decrease stress. This may help lower your blood pressure. Learn ways to relax, such as deep breathing or listening to music.      •Limit alcohol as directed. Alcohol can increase your blood pressure. A drink of alcohol is 12 ounces of beer, 5 ounces of wine, or 1½ ounces of liquor. Your provider can help you set daily and weekly drink limits. He or she may recommend no alcohol if your blood pressure stays higher than goal even with medicine or other measures. Ask your provider for information if you need help to quit.      •Do not smoke. Nicotine and other chemicals in cigarettes and cigars can increase your blood pressure and also cause lung damage. Ask your provider for information if you currently smoke and need help to quit. E-cigarettes or smokeless tobacco still contain nicotine. Talk to your provider before you use these products.  Prevent Heart Disease            Follow up with your doctor or cardiologist as directed: You will need to return to have your blood pressure checked and to have other lab tests done. Write down your questions so you remember to ask them during your visits.

## 2022-08-17 NOTE — ED PROVIDER NOTE - NSICDXFAMILYHX_GEN_ALL_CORE_FT
FAMILY HISTORY:  Father  Still living? Unknown  FH: colon cancer, Age at diagnosis: Age Unknown    Sibling  Still living? Unknown  FH: HTN (hypertension), Age at diagnosis: Age Unknown  FH: type 2 diabetes, Age at diagnosis: Age Unknown

## 2022-08-17 NOTE — ED PROVIDER NOTE - PROGRESS NOTE DETAILS
James Burt MD.  gave oral labetalol 200mg, amlodipine 5mg, home dose   Nurse repeated BP: 151-110   Pt's baseline: 140/108 with home meds.   Will repeat BP in 1 hr. Jarvis Cardenas MD. patient with pmh htn came in from colonoscopy suite with concern for htn. found to be hypertensive there. no acute complaints. sent to ed for eval. patient asymptomatic at this time. did not take meds this morning. physical exam unremarkable other than hypertension. a/p: patient here with likely asymptomatic htn. no chest pain, headache, or sob to suggest end organ damage. did not take meds this am. plan to provide AM BP meds and reassess, likely d/c with pcp f/u.

## 2022-08-17 NOTE — ED PROVIDER NOTE - CARE PLAN
Principal Discharge DX:	Hypertensive urgency   1 Principal Discharge DX:	Asymptomatic hypertension

## 2022-08-17 NOTE — ED ADULT TRIAGE NOTE - CHIEF COMPLAINT QUOTE
pt. c/o HTN, went for routine colonoscopy and endoscopy and has hx of HTN, did not take BP meds prior to procedure. pt. has no complaints.

## 2022-08-17 NOTE — ED PROVIDER NOTE - ATTENDING CONTRIBUTION TO CARE
I, Jarvis Cardenas MD, personally saw the patient with the resident, and completed the key components of the history and physical exam. I then discussed the management plan with the resident.

## 2022-08-17 NOTE — ED PROVIDER NOTE - CLINICAL SUMMARY MEDICAL DECISION MAKING FREE TEXT BOX
This is a 43 y/o male with a PMH significant for HTN, diabetes, who comes in due to high BP reading at outpatient. Gave labetalol 200mg oral. BP at the ED went down from 172/125 to 151/110. Baseline for pt with home meds is 140/105.    Denies any chest pain, SOB, flank pain, urination issues, or other concerns.

## 2022-08-17 NOTE — ED PROVIDER NOTE - NSICDXPASTMEDICALHX_GEN_ALL_CORE_FT
PAST MEDICAL HISTORY:  BPH without urinary obstruction     Diabetes     DM (diabetes mellitus)     HLD (hyperlipidemia)     HTN (hypertension)

## 2022-08-17 NOTE — ED PROVIDER NOTE - CONDITION AT DISCHARGE:
Everything faxed.   Note completed
Shine Patterson with [facility] is calling because patient has appointment tomorrow for blood/urine. Please fax over any office notes/urine drips/urine cultures to [phone] Attn:   Shine Patterson.
Improved

## 2022-08-17 NOTE — ED PROVIDER NOTE - NS ED ROS FT
Review of Symptoms  Gen: no fevers, chills  Visual: no recent changes in vision, no blurriness  Cardiovascular: no chest pain, no palpitations, no leg swelling  Respiratory: no shortness of breath, no exertional dyspnea, no cough, no rhinorrhea, no nasal congestion  GI: no nausea, no vomiting, no abdominal pain, no diarrhea  : no dysuria, no increased freq, no hematuria  Derm: no wounds, no rashes  Neuro: no headache, no numbness, no weakness

## 2022-08-17 NOTE — ED PROVIDER NOTE - PATIENT PORTAL LINK FT
You can access the FollowMyHealth Patient Portal offered by [facility] by registering at the following website: [email or web address]. By joining [employer]’s FollowMyHealth portal, you will also be able to view your health information using other applications (apps) compatible with our system.

## 2022-08-17 NOTE — ED ADULT NURSE NOTE - OBJECTIVE STATEMENT
Pt c/o high blood pressure since this orning. pt usually take BP meds daily, has not taken any medications because he was scheduled for colonoscopy for this morning. /125 in ED. denies chest pain, SOB. c/o 3/10 neck pain on right side

## 2022-08-24 ENCOUNTER — NON-APPOINTMENT (OUTPATIENT)
Age: 42
End: 2022-08-24

## 2022-12-10 NOTE — ED ADULT NURSE NOTE - OBJECTIVE STATEMENT
Pt c/o uncontrolled HTN with headache yesterday, pt still hypertensive, denies chest pain or SOB Yes

## 2023-12-05 NOTE — DIETITIAN NUTRITION RISK NOTIFICATION - UPON NUTRITIONAL ASSESSMENT BY THE REGISTERED DIETITIAN YOUR PATIENT WAS DETERMINED TO MEET CRITERIA/HAS EVIDENCE OF THE FOLLOWING DIAGNOSIS:
Detail Level: Detailed Biopsy Photograph Reviewed: Yes Number Of Curettages: 3 Moderate protein-calorie malnutrition Size Of Lesion In Cm: 0.4 Size Of Lesion After Curettage: 0.6 Add Intralesional Injection: No Total Volume (Ccs): 1 Anesthesia Type: 1% lidocaine with epinephrine and a 1:10 solution of 8.4% sodium bicarbonate Cautery Type: drysol What Was Performed First?: Curettage Final Size Statement: The size of the lesion after curettage was Additional Information: (Optional): The wound was cleaned, and a pressure dressing was applied.  The patient received detailed post-op instructions. Consent was obtained from the patient. The risks, benefits and alternatives to therapy were discussed in detail. Specifically, the risks of infection, scarring, bleeding, prolonged wound healing, nerve injury, incomplete removal, allergy to anesthesia and recurrence were addressed. Alternatives to ED&C, such as: surgical removal and XRT were also discussed.  Prior to the procedure, the treatment site was clearly identified and confirmed by the patient. All components of Universal Protocol/PAUSE Rule completed. Post-Care Instructions: I reviewed with the patient in detail post-care instructions. Patient is to keep the area dry for 48 hours, and not to engage in any swimming until the area is healed. Should the patient develop any fevers, chills, bleeding, severe pain patient will contact the office immediately. Bill As A Line Item Or As Units: Line Item Size Of Lesion In Cm: 0.5 Size Of Lesion After Curettage: 0.7

## 2024-04-29 NOTE — ED CDU PROVIDER SUBSEQUENT DAY NOTE - MUSCULOSKELETAL NEGATIVE STATEMENT, MLM
Resting    Vital Signs Last 24 Hrs  T(C): 36.7 (04-29-24 @ 08:57), Max: 36.7 (04-29-24 @ 08:57)  T(F): 98.1 (04-29-24 @ 08:57), Max: 98.1 (04-29-24 @ 08:57)  HR: 68 (04-29-24 @ 08:57) (68 - 70)  BP: 153/75 (04-29-24 @ 08:57) (153/75 - 160/91)  RR: 15 (04-29-24 @ 08:57) (15 - 15)  SpO2: 100% (04-29-24 @ 08:57) (100% - 100%)    s1s2  b/l air entry  soft, ND  sm edema                                                                                                                 acetaminophen     Tablet .. 975 milliGRAM(s) Oral every 8 hours  aluminum hydroxide/magnesium hydroxide/simethicone Suspension 30 milliLiter(s) Oral every 4 hours PRN  AQUAPHOR (petrolatum Ointment) 1 Application(s) Topical two times a day  epoetin carito-epbx (RETACRIT) Injectable 23899 Unit(s) IV Push <User Schedule>  lidocaine   4% Patch 1 Patch Transdermal <User Schedule>  melatonin 6 milliGRAM(s) Oral at bedtime PRN  metoprolol tartrate 25 milliGRAM(s) Oral two times a day  Nephro-pat 1 Tablet(s) Oral daily  oxyCODONE    IR 5 milliGRAM(s) Oral every 6 hours PRN  oxyCODONE    IR 10 milliGRAM(s) Oral every 6 hours PRN  pantoprazole    Tablet 40 milliGRAM(s) Oral two times a day  polyethylene glycol 3350 17 Gram(s) Oral daily PRN  senna 2 Tablet(s) Oral at bedtime  sevelamer carbonate 1600 milliGRAM(s) Oral three times a day with meals    A/P:    ESRD on HD   S/p fall, R hip fx  S/p R hip HA 3/7  Adm to rehab   On Epoetin w/HD 3 x week   GIB, s/p multiple units of PRBCs  S/p EGD/colonoscopy, duodenal ulcer  HD tomorrow  Will f/u CBC    [phone] no back pain, no gout, no musculoskeletal pain, no neck pain, and no weakness.

## 2024-10-24 NOTE — PROGRESS NOTE ADULT - SUBJECTIVE AND OBJECTIVE BOX
1 stick right ac 23g   41 year old male with PMH HTN, T2DM, HLD admitted for severe anemia found to have colonic mass s/p 3u pRBCS for robotic colectomy.     Based on chart review, no further tests or consults required to proceed to OR.    ASA 3    Renato Collins

## 2025-01-06 NOTE — DIETITIAN INITIAL EVALUATION ADULT. - PATIENT PROFILE REVIEWED

## 2025-01-26 ENCOUNTER — EMERGENCY (EMERGENCY)
Facility: HOSPITAL | Age: 45
LOS: 1 days | Discharge: HOME | End: 2025-01-26
Attending: EMERGENCY MEDICINE
Payer: COMMERCIAL

## 2025-01-26 VITALS
HEART RATE: 65 BPM | TEMPERATURE: 98 F | SYSTOLIC BLOOD PRESSURE: 152 MMHG | RESPIRATION RATE: 20 BRPM | DIASTOLIC BLOOD PRESSURE: 114 MMHG | OXYGEN SATURATION: 99 %

## 2025-01-26 VITALS
TEMPERATURE: 98 F | SYSTOLIC BLOOD PRESSURE: 206 MMHG | WEIGHT: 188.05 LBS | RESPIRATION RATE: 20 BRPM | DIASTOLIC BLOOD PRESSURE: 114 MMHG | OXYGEN SATURATION: 97 %

## 2025-01-26 PROBLEM — N40.0 BENIGN PROSTATIC HYPERPLASIA WITHOUT LOWER URINARY TRACT SYMPTOMS: Chronic | Status: ACTIVE | Noted: 2022-08-17

## 2025-01-26 PROBLEM — E11.9 TYPE 2 DIABETES MELLITUS WITHOUT COMPLICATIONS: Chronic | Status: ACTIVE | Noted: 2022-08-17

## 2025-01-26 LAB
ALBUMIN SERPL ELPH-MCNC: 4 G/DL — SIGNIFICANT CHANGE UP (ref 3.3–5.2)
ALP SERPL-CCNC: 88 U/L — SIGNIFICANT CHANGE UP (ref 40–120)
ALT FLD-CCNC: 30 U/L — SIGNIFICANT CHANGE UP
ANION GAP SERPL CALC-SCNC: 14 MMOL/L — SIGNIFICANT CHANGE UP (ref 5–17)
AST SERPL-CCNC: 22 U/L — SIGNIFICANT CHANGE UP
BASOPHILS # BLD AUTO: 0.02 K/UL — SIGNIFICANT CHANGE UP (ref 0–0.2)
BASOPHILS NFR BLD AUTO: 0.3 % — SIGNIFICANT CHANGE UP (ref 0–2)
BILIRUB SERPL-MCNC: <0.2 MG/DL — LOW (ref 0.4–2)
BUN SERPL-MCNC: 20.4 MG/DL — HIGH (ref 8–20)
CALCIUM SERPL-MCNC: 9.4 MG/DL — SIGNIFICANT CHANGE UP (ref 8.4–10.5)
CHLORIDE SERPL-SCNC: 100 MMOL/L — SIGNIFICANT CHANGE UP (ref 96–108)
CO2 SERPL-SCNC: 21 MMOL/L — LOW (ref 22–29)
CREAT SERPL-MCNC: 1.28 MG/DL — SIGNIFICANT CHANGE UP (ref 0.5–1.3)
EGFR: 70 ML/MIN/1.73M2 — SIGNIFICANT CHANGE UP
EGFR: 70 ML/MIN/1.73M2 — SIGNIFICANT CHANGE UP
EOSINOPHIL # BLD AUTO: 0.22 K/UL — SIGNIFICANT CHANGE UP (ref 0–0.5)
EOSINOPHIL NFR BLD AUTO: 3.3 % — SIGNIFICANT CHANGE UP (ref 0–6)
GLUCOSE SERPL-MCNC: 385 MG/DL — HIGH (ref 70–99)
HCT VFR BLD CALC: 42.6 % — SIGNIFICANT CHANGE UP (ref 39–50)
HGB BLD-MCNC: 15.2 G/DL — SIGNIFICANT CHANGE UP (ref 13–17)
IMM GRANULOCYTES NFR BLD AUTO: 0.5 % — SIGNIFICANT CHANGE UP (ref 0–0.9)
LYMPHOCYTES # BLD AUTO: 1.08 K/UL — SIGNIFICANT CHANGE UP (ref 1–3.3)
LYMPHOCYTES # BLD AUTO: 16.4 % — SIGNIFICANT CHANGE UP (ref 13–44)
MCHC RBC-ENTMCNC: 28.3 PG — SIGNIFICANT CHANGE UP (ref 27–34)
MCHC RBC-ENTMCNC: 35.7 G/DL — SIGNIFICANT CHANGE UP (ref 32–36)
MCV RBC AUTO: 79.3 FL — LOW (ref 80–100)
MONOCYTES # BLD AUTO: 0.48 K/UL — SIGNIFICANT CHANGE UP (ref 0–0.9)
MONOCYTES NFR BLD AUTO: 7.3 % — SIGNIFICANT CHANGE UP (ref 2–14)
NEUTROPHILS # BLD AUTO: 4.76 K/UL — SIGNIFICANT CHANGE UP (ref 1.8–7.4)
NEUTROPHILS NFR BLD AUTO: 72.2 % — SIGNIFICANT CHANGE UP (ref 43–77)
PLATELET # BLD AUTO: 189 K/UL — SIGNIFICANT CHANGE UP (ref 150–400)
POTASSIUM SERPL-MCNC: 4 MMOL/L — SIGNIFICANT CHANGE UP (ref 3.5–5.3)
POTASSIUM SERPL-SCNC: 4 MMOL/L — SIGNIFICANT CHANGE UP (ref 3.5–5.3)
PROT SERPL-MCNC: 7.1 G/DL — SIGNIFICANT CHANGE UP (ref 6.6–8.7)
RBC # BLD: 5.37 M/UL — SIGNIFICANT CHANGE UP (ref 4.2–5.8)
RBC # FLD: 12.4 % — SIGNIFICANT CHANGE UP (ref 10.3–14.5)
SODIUM SERPL-SCNC: 135 MMOL/L — SIGNIFICANT CHANGE UP (ref 135–145)
TROPONIN T, HIGH SENSITIVITY RESULT: 23 NG/L — SIGNIFICANT CHANGE UP (ref 0–51)
TROPONIN T, HIGH SENSITIVITY RESULT: 23 NG/L — SIGNIFICANT CHANGE UP (ref 0–51)
WBC # BLD: 6.59 K/UL — SIGNIFICANT CHANGE UP (ref 3.8–10.5)
WBC # FLD AUTO: 6.59 K/UL — SIGNIFICANT CHANGE UP (ref 3.8–10.5)

## 2025-01-26 PROCEDURE — 96374 THER/PROPH/DIAG INJ IV PUSH: CPT

## 2025-01-26 PROCEDURE — 84484 ASSAY OF TROPONIN QUANT: CPT

## 2025-01-26 PROCEDURE — 36415 COLL VENOUS BLD VENIPUNCTURE: CPT

## 2025-01-26 PROCEDURE — 99285 EMERGENCY DEPT VISIT HI MDM: CPT | Mod: 25

## 2025-01-26 PROCEDURE — 85025 COMPLETE CBC W/AUTO DIFF WBC: CPT

## 2025-01-26 PROCEDURE — 71045 X-RAY EXAM CHEST 1 VIEW: CPT | Mod: 26

## 2025-01-26 PROCEDURE — 71045 X-RAY EXAM CHEST 1 VIEW: CPT

## 2025-01-26 PROCEDURE — 93005 ELECTROCARDIOGRAM TRACING: CPT

## 2025-01-26 PROCEDURE — 80053 COMPREHEN METABOLIC PANEL: CPT

## 2025-01-26 PROCEDURE — 93010 ELECTROCARDIOGRAM REPORT: CPT

## 2025-01-26 PROCEDURE — 99285 EMERGENCY DEPT VISIT HI MDM: CPT

## 2025-01-26 RX ORDER — LABETALOL HYDROCHLORIDE 200 MG/1
10 TABLET, FILM COATED ORAL ONCE
Refills: 0 | Status: COMPLETED | OUTPATIENT
Start: 2025-01-26 | End: 2025-01-26

## 2025-01-26 RX ADMIN — LABETALOL HYDROCHLORIDE 10 MILLIGRAM(S): 200 TABLET, FILM COATED ORAL at 14:40

## 2025-06-30 ENCOUNTER — NON-APPOINTMENT (OUTPATIENT)
Age: 45
End: 2025-06-30

## 2025-07-02 ENCOUNTER — APPOINTMENT (OUTPATIENT)
Dept: SURGERY | Facility: CLINIC | Age: 45
End: 2025-07-02
Payer: COMMERCIAL

## 2025-07-02 VITALS
WEIGHT: 182 LBS | BODY MASS INDEX: 27.27 KG/M2 | OXYGEN SATURATION: 98 % | TEMPERATURE: 98.5 F | DIASTOLIC BLOOD PRESSURE: 108 MMHG | HEART RATE: 71 BPM | SYSTOLIC BLOOD PRESSURE: 194 MMHG | RESPIRATION RATE: 16 BRPM | HEIGHT: 68.5 IN

## 2025-07-02 VITALS — SYSTOLIC BLOOD PRESSURE: 154 MMHG | DIASTOLIC BLOOD PRESSURE: 98 MMHG

## 2025-07-02 PROCEDURE — 99203 OFFICE O/P NEW LOW 30 MIN: CPT

## 2025-07-02 RX ORDER — AMLODIPINE BESYLATE 10 MG/1
10 TABLET ORAL
Refills: 0 | Status: ACTIVE | COMMUNITY

## 2025-07-02 RX ORDER — GLIMEPIRIDE 4 MG/1
4 TABLET ORAL
Refills: 0 | Status: ACTIVE | COMMUNITY

## 2025-07-02 RX ORDER — SITAGLIPTIN 25 MG/1
25 TABLET, FILM COATED ORAL
Refills: 0 | Status: ACTIVE | COMMUNITY

## 2025-07-02 RX ORDER — TAMSULOSIN HYDROCHLORIDE 0.4 MG/1
0.4 CAPSULE ORAL
Refills: 0 | Status: ACTIVE | COMMUNITY

## 2025-07-19 ENCOUNTER — OUTPATIENT (OUTPATIENT)
Dept: OUTPATIENT SERVICES | Facility: HOSPITAL | Age: 45
LOS: 1 days | End: 2025-07-19

## 2025-07-19 ENCOUNTER — APPOINTMENT (OUTPATIENT)
Dept: CT IMAGING | Facility: CLINIC | Age: 45
End: 2025-07-19

## 2025-07-19 DIAGNOSIS — K43.9 VENTRAL HERNIA WITHOUT OBSTRUCTION OR GANGRENE: ICD-10-CM

## 2025-07-19 PROCEDURE — 74176 CT ABD & PELVIS W/O CONTRAST: CPT | Mod: 26

## 2025-08-06 ENCOUNTER — APPOINTMENT (OUTPATIENT)
Dept: SURGERY | Facility: CLINIC | Age: 45
End: 2025-08-06
Payer: COMMERCIAL

## 2025-08-06 VITALS — DIASTOLIC BLOOD PRESSURE: 109 MMHG | SYSTOLIC BLOOD PRESSURE: 188 MMHG

## 2025-08-06 VITALS
HEART RATE: 71 BPM | TEMPERATURE: 97.6 F | HEIGHT: 68.5 IN | OXYGEN SATURATION: 96 % | RESPIRATION RATE: 16 BRPM | WEIGHT: 181 LBS | DIASTOLIC BLOOD PRESSURE: 115 MMHG | BODY MASS INDEX: 27.12 KG/M2 | SYSTOLIC BLOOD PRESSURE: 191 MMHG

## 2025-08-06 DIAGNOSIS — K43.9 VENTRAL HERNIA W/OUT OBSTRUCTION OR GANGRENE: ICD-10-CM

## 2025-08-06 PROCEDURE — 99213 OFFICE O/P EST LOW 20 MIN: CPT

## 2025-08-25 ENCOUNTER — APPOINTMENT (OUTPATIENT)
Dept: SURGERY | Facility: CLINIC | Age: 45
End: 2025-08-25
Payer: COMMERCIAL

## 2025-08-25 VITALS
HEIGHT: 68.5 IN | SYSTOLIC BLOOD PRESSURE: 197 MMHG | TEMPERATURE: 97.5 F | BODY MASS INDEX: 28.17 KG/M2 | RESPIRATION RATE: 16 BRPM | DIASTOLIC BLOOD PRESSURE: 110 MMHG | WEIGHT: 188 LBS | OXYGEN SATURATION: 97 % | HEART RATE: 72 BPM

## 2025-08-25 VITALS — DIASTOLIC BLOOD PRESSURE: 117 MMHG | SYSTOLIC BLOOD PRESSURE: 201 MMHG

## 2025-08-25 DIAGNOSIS — K43.2 INCISIONAL HERNIA W/OUT OBSTRUCTION OR GANGRENE: ICD-10-CM

## 2025-08-25 PROCEDURE — 99215 OFFICE O/P EST HI 40 MIN: CPT

## (undated) DEVICE — LIGASURE IMPACT

## (undated) DEVICE — PACK ROBOTIC

## (undated) DEVICE — SUCTION YANKAUER TAPERED BULBOUS NO VENT

## (undated) DEVICE — PRECISION CUT SCISSOR MICROLINE MINI ENDOCUT DISP

## (undated) DEVICE — XI SEAL UNIV 5- 8 MM

## (undated) DEVICE — SUT VICRYL 0 27" UR-6

## (undated) DEVICE — DRAPE ROBOTIC

## (undated) DEVICE — TUBE TRI-LUMEN FILT USE W AIRSEAL

## (undated) DEVICE — VALVE ENDOSCOPE DEFENDO SINGLE USE

## (undated) DEVICE — SUT VICRYL 3-0 27" SH UNDYED

## (undated) DEVICE — TRAY IRRIGATION SYR BULB 60CC

## (undated) DEVICE — Device

## (undated) DEVICE — SUT CHROMIC 3-0 27" SH

## (undated) DEVICE — SOL IRR POUR NS 0.9% 1000ML

## (undated) DEVICE — COVER TIP INTUITIVE W INSERTION TOOL

## (undated) DEVICE — FORCEP RADIAL JAW 4 W NDL 2.4MM 2.8MM 240CM ORANGE DISP

## (undated) DEVICE — POSITIONER FOAM EGG CRATE ULNAR (2PCS)

## (undated) DEVICE — TROCAR SURGIQUEST AIRSEAL 5MMX100MM

## (undated) DEVICE — SUT VICRYL 2-0 27" CT-1 UNDYED

## (undated) DEVICE — KIT SIGMOIDOSCOPE NO SWAB SGL USE

## (undated) DEVICE — STAPLER SKIN PROXIMATE

## (undated) DEVICE — SUT DERMABOND 0.7ML

## (undated) DEVICE — XI DRAPE ARM

## (undated) DEVICE — PACK MINOR WITH LAP

## (undated) DEVICE — LIGASURE L-HOOK SEALER 44CM

## (undated) DEVICE — XI DRAPE COLUMN

## (undated) DEVICE — TUBING CONNECTING 6MM 20FT

## (undated) DEVICE — SUT PDS II 1 48" TP-1

## (undated) DEVICE — XI OBTURATOR OPTICAL BLADELESS 8MM

## (undated) DEVICE — PREP CHLORAPREP ORANGE 2PCT 26ML

## (undated) DEVICE — SOL IRR POUR NS 0.9% 500ML

## (undated) DEVICE — WRAP COMPRESSION CALF MED

## (undated) DEVICE — ELCTR GROUNDING PAD ADULT COVIDIEN

## (undated) DEVICE — DRAPE TOWEL BLUE 17" X 24"

## (undated) DEVICE — SUT VICRYL PLUS 4-0 18" PS-2 UNDYED

## (undated) DEVICE — SOL IRR POUR H2O 1000ML

## (undated) DEVICE — XI SEALER DA VINCI VESSEL

## (undated) DEVICE — STAPLER COVIDIEN ENDO GIA STANDARD HANDLE